# Patient Record
Sex: FEMALE | Race: BLACK OR AFRICAN AMERICAN | NOT HISPANIC OR LATINO | URBAN - METROPOLITAN AREA
[De-identification: names, ages, dates, MRNs, and addresses within clinical notes are randomized per-mention and may not be internally consistent; named-entity substitution may affect disease eponyms.]

---
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
## 2021-01-05 ENCOUNTER — APPOINTMENT (OUTPATIENT)
Dept: OBGYN | Facility: CLINIC | Age: 21
End: 2021-01-05

## 2021-01-24 ENCOUNTER — EMERGENCY (EMERGENCY)
Facility: HOSPITAL | Age: 21
LOS: 0 days | Discharge: HOME | End: 2021-01-24
Attending: EMERGENCY MEDICINE | Admitting: EMERGENCY MEDICINE
Payer: MEDICAID

## 2021-01-24 VITALS
SYSTOLIC BLOOD PRESSURE: 132 MMHG | TEMPERATURE: 99 F | RESPIRATION RATE: 17 BRPM | DIASTOLIC BLOOD PRESSURE: 63 MMHG | OXYGEN SATURATION: 100 % | HEART RATE: 82 BPM

## 2021-01-24 DIAGNOSIS — F41.9 ANXIETY DISORDER, UNSPECIFIED: ICD-10-CM

## 2021-01-24 DIAGNOSIS — Z88.8 ALLERGY STATUS TO OTHER DRUGS, MEDICAMENTS AND BIOLOGICAL SUBSTANCES: ICD-10-CM

## 2021-01-24 PROCEDURE — 99284 EMERGENCY DEPT VISIT MOD MDM: CPT

## 2021-01-24 RX ORDER — HYDROXYZINE HCL 10 MG
1 TABLET ORAL
Qty: 21 | Refills: 0
Start: 2021-01-24 | End: 2021-01-30

## 2021-01-24 RX ORDER — HYDROXYZINE HCL 10 MG
25 TABLET ORAL ONCE
Refills: 0 | Status: DISCONTINUED | OUTPATIENT
Start: 2021-01-24 | End: 2021-01-24

## 2021-01-24 NOTE — ED PROVIDER NOTE - PATIENT PORTAL LINK FT
You can access the FollowMyHealth Patient Portal offered by [facility] by registering at the following website: [email or web address]. By joining [employer]’s FollowMyHealth portal, you will also be able to view your health information using other applications (apps) compatible with our system.

## 2021-01-24 NOTE — ED BEHAVIORAL HEALTH ASSESSMENT NOTE - OTHER PAST PSYCHIATRIC HISTORY (INCLUDE DETAILS REGARDING ONSET, COURSE OF ILLNESS, INPATIENT/OUTPATIENT TREATMENT)
hearing voices, SI mental health, on Risperidone, Abilify; stopped at 18yo  Nervous ever since; adhd dx as well. behaviors - talking to rueda/brushes; As per mother, patient was diagnosed with Schizophrenia at 11yo after she started hearing voices. She was treated outpatient at [facility], and was on Risperidone, Abilify but stopped meds/trx at 18yo. Also reports history of ADHD. Mother notes she has been more nervous since stopping treatment. No history of SI/I/P or SA.

## 2021-01-24 NOTE — ED BEHAVIORAL HEALTH ASSESSMENT NOTE - SUMMARY
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 Psychiatry was consulted to perform a mental health evaluation.    On evaluation, patient presents as anxious and interested in outpatient treatment. She does not pose an acute safety risk to herself/others and does not warrant involuntary inpatient psychiatric admission at this time. Referral to be made to [facility] ([address] [place] [address], [id number]; [phone]). Please send patient home with 1wk supply of Atarax 50mg Q6 PRN for anxiety.

## 2021-01-24 NOTE — ED PROVIDER NOTE - CLINICAL SUMMARY MEDICAL DECISION MAKING FREE TEXT BOX
19 yo female with anxiety related to the father of her children going to long-term tomorrow, she has 2 small children  is unemployed and lives with mom.,  No SI/HI, patient was evaluated by psych service and cleared for d/c , outpatient follow up planned, patient was given contact info and was prescribed Hydroxyzine for anxiety,  Stable for d/c home, strict return precautions given.

## 2021-01-24 NOTE — ED PROVIDER NOTE - NS ED ROS FT
Eyes:  No visual changes, eye pain or discharge.  ENMT:  No hearing changes, pain, discharge or infections. No neck pain or stiffness.  Cardiac:  No chest pain, SOB or edema. No chest pain with exertion.  Respiratory:  No cough or respiratory distress. No hemoptysis. No history of asthma or RAD.  GI:  No nausea, vomiting, diarrhea or abdominal pain.  :  No dysuria, frequency or burning.  MS:  No myalgia, muscle weakness, joint pain or back pain.  Neuro:  No headache or weakness.  No LOC.  Skin:  No skin rash.

## 2021-01-24 NOTE — ED PROVIDER NOTE - NSFOLLOWUPCLINICS_GEN_ALL_ED_FT
[facility]  OP Mental Health  [address] [place] [id number]  Phone: [phone]  Fax:   Follow Up Time:

## 2021-01-24 NOTE — ED PROVIDER NOTE - OBJECTIVE STATEMENT
21 y/o F with no pmh presenting with anxiety. States that her anxiety and stress have been worsening over the past few weeks, states that her children's father is going to nursing home tomorrow and she has been very upset about this. Denies thoughts of hurting herself or others, no illicit drugs, alcohol, or smoking. No hallucinations. Patient recently gave birth in Oct. 2020, also had a 1.4 y/o at home. Loves with her mother at home who helps with the children. States that she has been crying a lot, isn't sleeping well. No medical complaints at this time. 19 y/o F with no pmh presenting with anxiety. States that her anxiety and stress have been worsening over the past few weeks, states that her children's father is going to [facility] tomorrow and she has been very upset about this. Denies thoughts of hurting herself or others, no illicit drugs, alcohol, or smoking. No hallucinations. Patient recently gave birth in Oct. 2020, also had a 1.4 y/o at home. Lives with her mother at home who helps with the children. States that she has been crying a lot, isn't sleeping well. No medical complaints at this time.

## 2021-01-24 NOTE — ED BEHAVIORAL HEALTH ASSESSMENT NOTE - HPI (INCLUDE ILLNESS QUALITY, SEVERITY, DURATION, TIMING, CONTEXT, MODIFYING FACTORS, ASSOCIATED SIGNS AND SYMPTOMS)
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 Psychiatry was consulted to perform a mental health evaluation. 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 Psychiatry was consulted to perform a mental health evaluation.    Patient reports that over the last 2 wks she has had worsening anxiety, accompanied by panic attacks. She reports during the episodes getting CP, SOB, crying, shaking, and pacing (including 1 earlier today). She also reports feeling anxious throughout the day, and noticing her sleep getting more dysregulated - she has difficulty falling asleep, resulting in waking up late and late day naps. She also mentions feeling "just weird" whenever she awakes from a nap. She noticed lack of appetite and some nausea today as well. She denies any depressed mood/feelings of hopelessness, suicidal ideations, plans, or intent. She relates all of her anxiety and panic attacks to the fact that her boyfriend is leaving tomorrow for correction for 1yr.    She denies any homicidal ideations, A/V hallucinations, paranoid thoughts, increased talkativeness/grandiosity/decreased need for sleep. She denies any substance use as well. Patient reports several coping skills she utilizes when she starts to feel panicked - going into a dark room, showering, sitting under the cool air of the window. Patient mentions that she is motivated to get treatment since she wants to be mentally healthy in order to be there for her babies.      COLLATERAL was obtained from patient's mother, Eloisa Barboza ([phone]). She reports that patient used to be on Risperidone and Abilify from [age]yo for Schizophrenia but came off meds on her own and stopped treatment. She denies noticing any recurrence of psychotic symptoms but has noticed patient has seemed more "nervous" since stopping the medications. She denies any acute safety concerns. She mentions that patient has a good support system at home and they will make sure to help her in order to get the treatment she needs.

## 2021-01-24 NOTE — ED PROVIDER NOTE - PROGRESS NOTE DETAILS
RAHAT (resident): psych. consulted, will come to see patient in the ED. Will f/u psych. recommendations. Dayna: patient endorsed to me to follow up psych eval, reassess and dispo. The patient appears well, she was evaluated by psychiatry and cleared for d/c home.  Will prescribe Hydroxyzine for anxiety, patient was advised to followup with mental health services outpatient, contact info was giv en to her by psych, strict return precautions given.  Patient verbalized understanding and is amenable with the plan.

## 2021-01-24 NOTE — ED PROVIDER NOTE - ATTENDING CONTRIBUTION TO CARE
20F no pmh p/w anxiety. states her childrens father and boyfriend is going to skilled nursing tomorrow for 1 year and shes very upset and depressed about this. no si, hi, hallucinations. no drugs, etoh. pt has a 1.5 yr old and 2 mo old. pt lives w mother who helps her with kids. pt states she cant sleep, eat, and is crying uncontrollably at times. pt used to be on risperadone at 14 yo for being "wild" stopped it on her own because he symptoms were improved. no prior psych admissions. mother is home w kids now. boyfriend dropped her off to ED but left and went home. boyfriend lives w grandmother. pt wants to see psych. has no psych follow up as outpt. no medical complaints.     denies fever, cough, cp, sob, abd pain, nvdc, dysuria, freq, hematuria, ha, numbness, weakness, palp, loc, trauma.     on exam, AFVSS, well udsty nad, ncat, eomi, perrla, mmm, lctab, rrr nl s1s2 no mrg, abd soft ntnd, aaox3, no focal deficits, no le edema or calf ttp, tearful     a/p; Anxiety, will get psych consult

## 2021-01-24 NOTE — ED PROVIDER NOTE - NSFOLLOWUPINSTRUCTIONS_ED_ALL_ED_FT
Anxiety    Generalized anxiety disorder (DUONG) is a mental disorder. It is defined as anxiety that is not necessarily related to specific events or activities or is out of proportion to said events. Symptoms include restlessness, fatigue, difficulty concentrations, irritability and difficulty concentrating. It interferes with life functions, including relationships, work, and school. If you were started on a medication make sure to take exactly as prescribed and follow up with a psychiatrist.    SEEK IMMEDIATE MEDICAL CARE IF YOU HAVE THE FOLLOWING SYMPTOMS: thoughts about hurting killing yourself, thoughts about hurting or killing somebody else, hallucinations, or worsening depression.

## 2021-01-24 NOTE — ED PROVIDER NOTE - PHYSICAL EXAMINATION
CONSTITUTIONAL: Well-developed; well-nourished  SKIN: warm, dry  HEAD: Normocephalic; atraumatic.  EYES: PERRL, EOMI, no conjunctival erythema  ENT: No nasal discharge; airway clear.  NECK: Supple; non tender.  CARD: S1, S2 normal; no murmurs, gallops, or rubs. Regular rate and rhythm.   RESP: No wheezes, rales or rhonchi.  ABD: soft ntnd  EXT: Normal ROM.  No clubbing, cyanosis or edema.   LYMPH: No acute cervical adenopathy.  NEURO: Alert, oriented, grossly unremarkable  PSYCH: Cooperative, appears tearful and anxious.

## 2021-01-24 NOTE — ED BEHAVIORAL HEALTH ASSESSMENT NOTE - DESCRIPTION
Uneventful, patient calm and in good behavioral control; no pharmacological or physical restraints were needed. none known Patient is in a relationship with the father of her 2 children (2mo, 2yo), but he is leaving for [employer] tomorrow for 1yr. She currently lives with the children and her mother. She is not employed, but cares for her children. She reports studying to get her GED as well as she made it to 12th grade but did not graduate.

## 2021-01-24 NOTE — ED BEHAVIORAL HEALTH ASSESSMENT NOTE - RISK ASSESSMENT
Modifiable risk factors include anxiety, psychiatric illness, recent psychosocial stressors, unemployment.  Protective factors include social support/family, parenthood, seeking out treatment/hopefullness. Low Acute Suicide Risk

## 2021-01-29 ENCOUNTER — OUTPATIENT (OUTPATIENT)
Dept: OUTPATIENT SERVICES | Facility: HOSPITAL | Age: 21
LOS: 1 days | Discharge: HOME | End: 2021-01-29

## 2021-01-29 ENCOUNTER — APPOINTMENT (OUTPATIENT)
Dept: PSYCHIATRY | Facility: CLINIC | Age: 21
End: 2021-01-29

## 2021-02-02 ENCOUNTER — APPOINTMENT (OUTPATIENT)
Dept: PSYCHIATRY | Facility: CLINIC | Age: 21
End: 2021-02-02

## 2021-02-02 ENCOUNTER — OUTPATIENT (OUTPATIENT)
Dept: OUTPATIENT SERVICES | Facility: HOSPITAL | Age: 21
LOS: 1 days | Discharge: HOME | End: 2021-02-02

## 2021-02-02 DIAGNOSIS — G47.00 INSOMNIA, UNSPECIFIED: ICD-10-CM

## 2021-02-02 DIAGNOSIS — F43.22 ADJUSTMENT DISORDER WITH ANXIETY: ICD-10-CM

## 2021-02-03 ENCOUNTER — OUTPATIENT (OUTPATIENT)
Dept: OUTPATIENT SERVICES | Facility: HOSPITAL | Age: 21
LOS: 1 days | Discharge: HOME | End: 2021-02-03

## 2021-02-03 ENCOUNTER — APPOINTMENT (OUTPATIENT)
Dept: PSYCHIATRY | Facility: CLINIC | Age: 21
End: 2021-02-03

## 2021-02-03 DIAGNOSIS — F43.22 ADJUSTMENT DISORDER WITH ANXIETY: ICD-10-CM

## 2021-02-11 ENCOUNTER — APPOINTMENT (OUTPATIENT)
Dept: PSYCHIATRY | Facility: CLINIC | Age: 21
End: 2021-02-11

## 2021-02-11 ENCOUNTER — OUTPATIENT (OUTPATIENT)
Dept: OUTPATIENT SERVICES | Facility: HOSPITAL | Age: 21
LOS: 1 days | Discharge: HOME | End: 2021-02-11

## 2021-02-11 DIAGNOSIS — F43.22 ADJUSTMENT DISORDER WITH ANXIETY: ICD-10-CM

## 2021-02-22 ENCOUNTER — TRANSCRIPTION ENCOUNTER (OUTPATIENT)
Age: 21
End: 2021-02-22

## 2021-02-22 ENCOUNTER — APPOINTMENT (OUTPATIENT)
Dept: PSYCHIATRY | Facility: CLINIC | Age: 21
End: 2021-02-22

## 2021-02-22 ENCOUNTER — OUTPATIENT (OUTPATIENT)
Dept: OUTPATIENT SERVICES | Facility: HOSPITAL | Age: 21
LOS: 1 days | Discharge: HOME | End: 2021-02-22

## 2021-02-23 DIAGNOSIS — F43.22 ADJUSTMENT DISORDER WITH ANXIETY: ICD-10-CM

## 2021-02-24 ENCOUNTER — APPOINTMENT (OUTPATIENT)
Dept: PSYCHIATRY | Facility: CLINIC | Age: 21
End: 2021-02-24

## 2021-02-24 ENCOUNTER — OUTPATIENT (OUTPATIENT)
Dept: OUTPATIENT SERVICES | Facility: HOSPITAL | Age: 21
LOS: 1 days | Discharge: HOME | End: 2021-02-24

## 2021-02-24 DIAGNOSIS — F43.23 ADJUSTMENT DISORDER WITH MIXED ANXIETY AND DEPRESSED MOOD: ICD-10-CM

## 2021-03-08 ENCOUNTER — APPOINTMENT (OUTPATIENT)
Dept: PSYCHIATRY | Facility: CLINIC | Age: 21
End: 2021-03-08

## 2021-03-09 ENCOUNTER — APPOINTMENT (OUTPATIENT)
Dept: OBGYN | Facility: CLINIC | Age: 21
End: 2021-03-09

## 2021-03-09 NOTE — ED BEHAVIORAL HEALTH ASSESSMENT NOTE - NS ED [facility] ED COURSE PSYCHIATRIC MEDICATION GIVEN
Diagnosis: Dysphonia R49.0      Precautions: Fall  Insurance Type (# Auth): Medicare (10 visits)   Date POC Expires: 5/30/2021      Total Treatment time: 60 min  Charges: [id number]    Treatment Number: 6/17    Subjective: She passed a temperature screening and None

## 2021-03-11 ENCOUNTER — OUTPATIENT (OUTPATIENT)
Dept: OUTPATIENT SERVICES | Facility: HOSPITAL | Age: 21
LOS: 1 days | Discharge: HOME | End: 2021-03-11

## 2021-03-11 ENCOUNTER — APPOINTMENT (OUTPATIENT)
Dept: PSYCHIATRY | Facility: CLINIC | Age: 21
End: 2021-03-11
Payer: MEDICAID

## 2021-03-11 DIAGNOSIS — F43.23 ADJUSTMENT DISORDER WITH MIXED ANXIETY AND DEPRESSED MOOD: ICD-10-CM

## 2021-03-11 PROCEDURE — ZZZZZ: CPT

## 2021-03-26 ENCOUNTER — APPOINTMENT (OUTPATIENT)
Dept: PSYCHIATRY | Facility: CLINIC | Age: 21
End: 2021-03-26

## 2021-04-12 ENCOUNTER — APPOINTMENT (OUTPATIENT)
Dept: PSYCHIATRY | Facility: CLINIC | Age: 21
End: 2021-04-12

## 2021-04-12 DIAGNOSIS — F43.23 ADJUSTMENT DISORDER WITH MIXED ANXIETY AND DEPRESSED MOOD: ICD-10-CM

## 2021-04-14 NOTE — OB RN TRIAGE NOTE - TEMPERATURE IN FAHRENHEIT (DEGREES F)
No new care gaps identified.  Powered by [employer]. Reference number: [id number].   4/14/2021 11:16:18 AM CDT   98.6

## 2021-04-19 ENCOUNTER — APPOINTMENT (OUTPATIENT)
Dept: OBGYN | Facility: CLINIC | Age: 21
End: 2021-04-19

## 2021-05-06 ENCOUNTER — APPOINTMENT (OUTPATIENT)
Dept: PSYCHIATRY | Facility: CLINIC | Age: 21
End: 2021-05-06

## 2021-05-11 ENCOUNTER — APPOINTMENT (OUTPATIENT)
Dept: OBGYN | Facility: CLINIC | Age: 21
End: 2021-05-11
Payer: MEDICAID

## 2021-05-11 PROCEDURE — 99395 PREV VISIT EST AGE 18-39: CPT

## 2021-05-11 PROCEDURE — 87490 CHLMYD TRACH DNA DIR PROBE: CPT

## 2021-05-11 PROCEDURE — 99072 ADDL SUPL MATRL&STAF TM PHE: CPT

## 2021-05-24 ENCOUNTER — NON-APPOINTMENT (OUTPATIENT)
Age: 21
End: 2021-05-24

## 2021-06-01 ENCOUNTER — APPOINTMENT (OUTPATIENT)
Dept: OBGYN | Facility: CLINIC | Age: 21
End: 2021-06-01

## 2021-06-03 ENCOUNTER — APPOINTMENT (OUTPATIENT)
Dept: OBGYN | Facility: CLINIC | Age: 21
End: 2021-06-03

## 2021-06-08 ENCOUNTER — NON-APPOINTMENT (OUTPATIENT)
Age: 21
End: 2021-06-08

## 2021-06-23 NOTE — PROCEDURE NOTE - NSCATHREMOVE_OBGYN_ALL_OB
No, to be performed by RN per instructions Complex Repair And Graft Additional Text (Will Appearing After The Standard Complex Repair Text): The complex repair was not sufficient to completely close the primary defect. The remaining additional defect was repaired with the graft mentioned below.

## 2021-07-02 ENCOUNTER — NON-APPOINTMENT (OUTPATIENT)
Age: 21
End: 2021-07-02

## 2021-07-15 ENCOUNTER — NON-APPOINTMENT (OUTPATIENT)
Age: 21
End: 2021-07-15

## 2021-07-15 ENCOUNTER — APPOINTMENT (OUTPATIENT)
Dept: OBGYN | Facility: CLINIC | Age: 21
End: 2021-07-15
Payer: MEDICAID

## 2021-07-15 PROCEDURE — 99213 OFFICE O/P EST LOW 20 MIN: CPT

## 2021-07-15 PROCEDURE — 76830 TRANSVAGINAL US NON-OB: CPT

## 2021-07-26 ENCOUNTER — APPOINTMENT (OUTPATIENT)
Dept: OBGYN | Facility: CLINIC | Age: 21
End: 2021-07-26

## 2021-08-03 ENCOUNTER — EMERGENCY (EMERGENCY)
Facility: HOSPITAL | Age: 21
LOS: 0 days | Discharge: HOME | End: 2021-08-03
Attending: EMERGENCY MEDICINE | Admitting: EMERGENCY MEDICINE
Payer: MEDICAID

## 2021-08-03 VITALS
HEART RATE: 80 BPM | SYSTOLIC BLOOD PRESSURE: 112 MMHG | DIASTOLIC BLOOD PRESSURE: 57 MMHG | HEIGHT: 67 IN | TEMPERATURE: 97 F | RESPIRATION RATE: 18 BRPM | OXYGEN SATURATION: 99 %

## 2021-08-03 DIAGNOSIS — R22.0 LOCALIZED SWELLING, MASS AND LUMP, HEAD: ICD-10-CM

## 2021-08-03 DIAGNOSIS — K08.89 OTHER SPECIFIED DISORDERS OF TEETH AND SUPPORTING STRUCTURES: ICD-10-CM

## 2021-08-03 DIAGNOSIS — Z88.8 ALLERGY STATUS TO OTHER DRUGS, MEDICAMENTS AND BIOLOGICAL SUBSTANCES: ICD-10-CM

## 2021-08-03 DIAGNOSIS — K01.1 IMPACTED TEETH: ICD-10-CM

## 2021-08-03 PROCEDURE — 99284 EMERGENCY DEPT VISIT MOD MDM: CPT

## 2021-08-03 RX ORDER — AMOXICILLIN 250 MG/5ML
1 SUSPENSION, RECONSTITUTED, ORAL (ML) ORAL
Qty: 21 | Refills: 0
Start: 2021-08-03 | End: 2021-08-09

## 2021-08-03 NOTE — ED PROVIDER NOTE - PATIENT PORTAL LINK FT
You can access the FollowMyHealth Patient Portal offered by  by registering at the following website: [email or web address]. By joining [employer]’s FollowMyHealth portal, you will also be able to view your health information using other applications (apps) compatible with our system.

## 2021-08-03 NOTE — ED PROVIDER NOTE - NS ED ROS FT
Consitutional: No fever, chills  ENT: +dental pain  Cardiac:  No chest pain  Respiratory:  No sob  GI:  No abdominal pain, nausea, vomiting, or diarrhea  Neuro:  No headache   Skin:  No skin rass

## 2021-08-03 NOTE — ED PROVIDER NOTE - OBJECTIVE STATEMENT
21yF w/o pmhx who present with left facial swelling. she's had 3 weeks of left lower wisdom tooth pain. today it started swelling and since her sister is coming to the ED 21yF w/o pmhx who present with left facial swelling. she's had 3 weeks of left lower wisdom tooth pain. today it started swelling and since her sister is coming to the ED today she wants 21yF w/o pmhx who present with left facial swelling. she's had 3 weeks of left lower wisdom tooth pain. today it started swelling and since her sister is coming to the ED today she wants to get seen by dental. no fever cp sob abd pain.

## 2021-08-03 NOTE — ED PROVIDER NOTE - CLINICAL SUMMARY MEDICAL DECISION MAKING FREE TEXT BOX
20 y/o M no PMH coming in with 1 day of L facial swelling. Over the past two weeks, Pt states that her L lower wisdom tooth has been hurting her. Not taking any meds for it. Pt has a private dentist but he/she is unable to give her an appointment which is what prompted the ED visit. No other symptoms, no trouble tolerating PO or difficulty breathing. On exam: Gen - NAD, Head - NCAT, (+) Tooth 17 with some surrounding gingival edema, no erythema. Mild TTP. No facial edema, no lymphadenopathy. Ext- FROM, no edema, erythema, ecchymosis, Neuro - CN 2-12 intact, nl strength and sensation, nl gait. Diagnosis: Dental pain. Transfer to dental clinic. 20 y/o M no PMH coming in with 1 day of L facial swelling. Over the past two weeks, Pt states that her L lower wisdom tooth has been hurting her. Not taking any meds for it. Pt has a private dentist but he/she is unable to give her an appointment which is what prompted the ED visit. No other symptoms, no trouble tolerating PO or difficulty breathing. On exam: Gen - NAD, Head - NCAT, (+) Tooth 17 with some surrounding gingival edema, no erythema. Mild TTP. No facial edema, no lymphadenopathy. Ext- FROM, no edema, erythema, ecchymosis, Neuro - CN 2-12 intact, nl strength and sensation, nl gait. Diagnosis: Dental pain. Transfer to dental clinic. Dental clinic closed prior to patient transfer. Dental resident evaluated patient at bedside in ED and performed dental block. Pt d/artemio home with Abx and advised f/u with dentist outpatient. Given return precautions.

## 2021-08-03 NOTE — ED PROVIDER NOTE - PHYSICAL EXAMINATION
CONSTITUTIONAL: Well-developed; well-nourished; in no acute distress.   SKIN: warm, dry  ENT: left lower tooth 17 tender to palpation, no exudate discharge or abscess  CARD: Regular rate and rhythm  RESP: CTAB  ABD: soft ntnd  NEURO: Alert, oriented, grossly unremarkable  PSYCH: Cooperative, appropriate.

## 2021-08-03 NOTE — ED PROVIDER NOTE - NSFOLLOWUPCLINICS_GEN_ALL_ED_FT
[facility]  Dental  [address] [place] [address]  Phone: [phone]  Fax:   Follow Up Time: 4-6 Days

## 2021-08-04 ENCOUNTER — APPOINTMENT (OUTPATIENT)
Dept: OTOLARYNGOLOGY | Facility: CLINIC | Age: 21
End: 2021-08-04

## 2021-09-02 ENCOUNTER — APPOINTMENT (OUTPATIENT)
Dept: OBGYN | Facility: CLINIC | Age: 21
End: 2021-09-02
Payer: MEDICAID

## 2021-09-02 PROCEDURE — 87075 CULTR BACTERIA EXCEPT BLOOD: CPT

## 2021-09-02 PROCEDURE — 99213 OFFICE O/P EST LOW 20 MIN: CPT

## 2021-09-28 ENCOUNTER — APPOINTMENT (OUTPATIENT)
Dept: OBGYN | Facility: CLINIC | Age: 21
End: 2021-09-28

## 2021-10-01 ENCOUNTER — APPOINTMENT (OUTPATIENT)
Dept: OTOLARYNGOLOGY | Facility: CLINIC | Age: 21
End: 2021-10-01

## 2022-01-20 ENCOUNTER — APPOINTMENT (OUTPATIENT)
Dept: OBGYN | Facility: CLINIC | Age: 22
End: 2022-01-20

## 2022-03-01 ENCOUNTER — APPOINTMENT (OUTPATIENT)
Dept: OBGYN | Facility: CLINIC | Age: 22
End: 2022-03-01
Payer: MEDICAID

## 2022-03-01 PROCEDURE — 76817 TRANSVAGINAL US OBSTETRIC: CPT

## 2022-03-01 PROCEDURE — 81025 URINE PREGNANCY TEST: CPT

## 2022-03-01 PROCEDURE — 87077 CULTURE AEROBIC IDENTIFY: CPT | Mod: QW

## 2022-03-01 PROCEDURE — 99215 OFFICE O/P EST HI 40 MIN: CPT

## 2022-03-15 ENCOUNTER — APPOINTMENT (OUTPATIENT)
Dept: OBGYN | Facility: CLINIC | Age: 22
End: 2022-03-15
Payer: MEDICAID

## 2022-03-15 ENCOUNTER — APPOINTMENT (OUTPATIENT)
Dept: OBGYN | Facility: CLINIC | Age: 22
End: 2022-03-15

## 2022-03-15 PROCEDURE — 76830 TRANSVAGINAL US NON-OB: CPT

## 2022-03-15 PROCEDURE — 99214 OFFICE O/P EST MOD 30 MIN: CPT

## 2022-04-05 ENCOUNTER — APPOINTMENT (OUTPATIENT)
Dept: OBGYN | Facility: CLINIC | Age: 22
End: 2022-04-05

## 2022-04-08 NOTE — ED BEHAVIORAL HEALTH ASSESSMENT NOTE - NSBHINFOSOURCE_PSY_ALL_CORE
What Type Of Note Output Would You Prefer (Optional)?: Standard Output How Severe Is Your Skin Lesion?: moderate Has Your Skin Lesion Been Treated?: not been treated Is This A New Presentation, Or A Follow-Up?: Skin Lesion Additional History: Patient states he runs and his toe look “funny.” Patient

## 2022-04-26 ENCOUNTER — APPOINTMENT (OUTPATIENT)
Dept: OBGYN | Facility: CLINIC | Age: 22
End: 2022-04-26
Payer: MEDICAID

## 2022-04-26 PROCEDURE — 99213 OFFICE O/P EST LOW 20 MIN: CPT

## 2022-05-19 NOTE — DISCHARGE NOTE OB - BREAST MILK SUPPORTS STABLE NEWBORN BLOOD SUGAR
Detail Level: Generalized
Detail Level: Detailed
Patient Specific Counseling (Will Not Stick From Patient To Patient): (Including spot of concern)
Statement Selected

## 2022-05-23 ENCOUNTER — APPOINTMENT (OUTPATIENT)
Dept: OBGYN | Facility: CLINIC | Age: 22
End: 2022-05-23

## 2022-06-10 ENCOUNTER — EMERGENCY (EMERGENCY)
Facility: HOSPITAL | Age: 22
LOS: 0 days | Discharge: HOME | End: 2022-06-10
Attending: EMERGENCY MEDICINE | Admitting: EMERGENCY MEDICINE
Payer: MEDICAID

## 2022-06-10 VITALS
SYSTOLIC BLOOD PRESSURE: 121 MMHG | OXYGEN SATURATION: 98 % | HEIGHT: 67 IN | HEART RATE: 99 BPM | DIASTOLIC BLOOD PRESSURE: 71 MMHG | TEMPERATURE: 98 F | RESPIRATION RATE: 18 BRPM

## 2022-06-10 DIAGNOSIS — M79.18 MYALGIA, OTHER SITE: ICD-10-CM

## 2022-06-10 DIAGNOSIS — Z88.6 ALLERGY STATUS TO ANALGESIC AGENT: ICD-10-CM

## 2022-06-10 DIAGNOSIS — O99.512 DISEASES OF THE RESPIRATORY SYSTEM COMPLICATING PREGNANCY, SECOND TRIMESTER: ICD-10-CM

## 2022-06-10 DIAGNOSIS — Z20.822 CONTACT WITH AND (SUSPECTED) EXPOSURE TO COVID-19: ICD-10-CM

## 2022-06-10 DIAGNOSIS — J06.9 ACUTE UPPER RESPIRATORY INFECTION, UNSPECIFIED: ICD-10-CM

## 2022-06-10 DIAGNOSIS — R51.9 HEADACHE, UNSPECIFIED: ICD-10-CM

## 2022-06-10 DIAGNOSIS — Z3A.19 19 WEEKS GESTATION OF PREGNANCY: ICD-10-CM

## 2022-06-10 DIAGNOSIS — O99.891 OTHER SPECIFIED DISEASES AND CONDITIONS COMPLICATING PREGNANCY: ICD-10-CM

## 2022-06-10 LAB
FLUAV AG NPH QL: SIGNIFICANT CHANGE UP
FLUBV AG NPH QL: SIGNIFICANT CHANGE UP
RSV RNA NPH QL NAA+NON-PROBE: SIGNIFICANT CHANGE UP
SARS-COV-2 RNA SPEC QL NAA+PROBE: SIGNIFICANT CHANGE UP

## 2022-06-10 PROCEDURE — 76815 OB US LIMITED FETUS(S): CPT | Mod: 26

## 2022-06-10 PROCEDURE — 99285 EMERGENCY DEPT VISIT HI MDM: CPT | Mod: 25

## 2022-06-10 NOTE — ED ADULT NURSE NOTE - DRUG PRE-SCREENING (DAST -1)
DATE:  02/18/2019



CARDIOLOGY FOLLOWUP



SUBJECTIVE:  The patient is awake, alert.  No chest pain noted.



PHYSICAL EXAMINATION

VITAL SIGNS:  Blood pressure 135/50 and the heart rate in the 80s.

NECK:  Negative JVD.

LUNGS:  Without rales.

HEART:  Reveal S1 and S2.

EXTREMITIES:  Without edema.



LABORATORY DATA:  Hemoglobin is 12.2.  Chemistries, BUN and creatinine

unremarkable.  Glucose 127.



IMPRESSION:

1.  Status post percutaneous transluminal coronary angioplasty and stent.

2.  Coronary artery disease.

3.  Status post gastrointestinal bleed.

4.  Dilated cardiomyopathy.

5.  Resolution of congestive heart failure.



Given these findings, we will continue on the aspirin and Plavix.  We will

discontinue the milrinone today.  The patient can be transferred to

telemetry.







__________________________________________

Onel Ann MD





DD:  02/18/2019 10:17:56

DT:  02/18/2019 10:25:29

Job # [id number] Statement Selected

## 2022-06-10 NOTE — ED PROVIDER NOTE - PROGRESS NOTE DETAILS
EILEEN:  on TAUS; pt told that the gender we gave her is unofficial, and has to be done by her OB at her upcoming anatomy appt.

## 2022-06-10 NOTE — ED ADULT NURSE NOTE - NSFALLRSKUNASSIST_ED_ALL_ED
"Progress Note:  2/10/2018, 10:04 AM    S: Rapid afib overnight, transferred to CICU. More neck pain and erythema.  CT neck repeated showing small abscess at base of neck and inflammation around the superior portion of the clavicle and the R SC joint.  There is some tissue air as well    O:  Blood pressure 154/97, pulse (!) 114, temperature 37.6 °C (99.7 °F), resp. rate 20, height 1.549 m (5' 1\"), weight 74.5 kg (164 lb 3.9 oz), SpO2 94 %, not currently breastfeeding.    -Uncomfortable  -breathing is not labored  -Tender erythema R base of neck, medial shoulder, and superior to clavicle. There is no palpable crepitus at this time.  There is no overt tenderness over R SC joint, but the erythema has become more medial      A:   Active Hospital Problems    Diagnosis   • Sepsis (CMS-HCC) [A41.9]     Priority: High   • Hypokalemia [E87.6]   • Lactic acidosis [E87.2]   • Neck pain [M54.2]   • Hyponatremia [E87.1]   • CAP (community acquired pneumonia) [J18.9]          P:   -Recommend ENT/Head & neck surgery consult for neck abscess.   -I think that the SC joint inflammatory process is secondary to surrounding infection of neck, but she may end up need SC joint resection.  No abscess of SC joint at this time. I would favor dealing with the neck collection first.  I am available to discuss with ENT any time.  -Continue antibiotics  -agree with CICU transfer for management of dysrhythmia  -The above was discussed with Dr. Badillo, the patient and her daughter    Gavin Gong M.D.  [facility]  [phone]    " no

## 2022-06-10 NOTE — ED PROVIDER NOTE - NS ED ROS FT
Constitutional:  No fever, chills, lethargy, or abnormal weight loss  Eyes:  No eye pain or visual changes  ENMT: +dental pain. No nasal discharge, no sore throat. No neck pain or stiffness  Cardiac:  No chest pain or palpitations  Respiratory:  No cough or respiratory distress  GI:  No nausea, vomiting, diarrhea or abdominal pain  :  No dysuria, frequency, or hematuria  MS:  +diffuse myalgias  Neuro:  +headache. No numbness, weakness, or tingling  Skin:  No skin rash or pruritus.   Except as documented in the HPI,  all other systems are negative

## 2022-06-10 NOTE — ED PROVIDER NOTE - CLINICAL SUMMARY MEDICAL DECISION MAKING FREE TEXT BOX
pt with HA likely viral vs allergic. Educated on tx and will fu with her OB and referred to new PMD. Also with tooth pain, pt sent to dental from the ED.

## 2022-06-10 NOTE — ED PROVIDER NOTE - ATTENDING CONTRIBUTION TO CARE
21 yo ~ 19 weeks pregnant per patient here with frontal HA and saying that she gets leg cramping. Pt has had leg cramping in prior pregnancy. Pt reports OB care but needs new PMD. Says she gets frontal HA. Some improvement with Tylenol. On exam pt is very well appearing, non toxic, HEENT with TTP at number #17 with some ttp of the gingiva without obvious collection, no trismus, CN intact, neck supple, chest clear, s1s2, no edema, nml gait 5/5 through t and negative rhomberg.  In way pt describes sx likley viral vs allergic. Pt educated on tx during pregnancy and to dental for evaluation of tooth.

## 2022-06-10 NOTE — ED PROVIDER NOTE - PHYSICAL EXAMINATION
VITAL SIGNS: I have reviewed nursing notes and confirm.  CONSTITUTIONAL: Well-appearing, non-toxic, in NAD  SKIN: Warm dry, normal skin turgor  HEAD: NCAT  EYES: No conjunctival injection, scleral anicteric  ENT: Moist mucous membranes, normal pharynx with no erythema or exudates. There is a dental corrina to Tooth 16 with small surrounding gingival hyperplasia. No floor of mouth tenderness or buccal swelling.   NECK: Supple; full ROM. Nontender. No cervical LAD  CARD: RRR, no murmurs, rubs or gallops  RESP: Clear to ausculation bilaterally.  No rales, rhonchi, or wheezing.  ABD: Soft, gravid abdomen, non-tender, no rebound or guarding. No CVA tenderness  EXT: Full ROM, no bony tenderness, no pedal edema, no calf tenderness  NEURO: Normal motor, normal sensory.Strength 5/5 thorughout. No pronator drift. CN II-XII intact and equal bilaterally. Cerebellar testing normal. Normal gait.  PSYCH: Cooperative, appropriate.

## 2022-06-10 NOTE — ED PROVIDER NOTE - CARE PROVIDER_API CALL
Erick La (MD)  Internal Medicine  [address] [place] [address]  Phone: [phone]  Fax: [phone]  Follow Up Time: 1-3 Days

## 2022-06-10 NOTE — ED PROVIDER NOTE - NSFOLLOWUPINSTRUCTIONS_ED_ALL_ED_FT
Please take Flonase on a daily basis, as this can better control your symptoms.    Please return to the ED immediately with any abdominal pain, intractable vomiting, fevers or chills, or shortness or breath or difficulty breathing at rest.

## 2022-06-10 NOTE — ED PROVIDER NOTE - OBJECTIVE STATEMENT
23yo female  at 19 weeks gestations (followed by OB, appt scheduled for next week for anatomy) presenting for headache x7days described as generalized throbbing relieved temporarily by tylenol, associated with diffuse body aches. No F/C at home. Presented to her UC last week and had a negative Covid/flu swab.   She has a secondary complaint of tooth pain to the left lower jaw.

## 2022-06-13 ENCOUNTER — APPOINTMENT (OUTPATIENT)
Dept: OBGYN | Facility: CLINIC | Age: 22
End: 2022-06-13

## 2022-06-16 ENCOUNTER — APPOINTMENT (OUTPATIENT)
Dept: OBGYN | Facility: CLINIC | Age: 22
End: 2022-06-16
Payer: MEDICAID

## 2022-06-16 PROCEDURE — 99213 OFFICE O/P EST LOW 20 MIN: CPT

## 2022-06-27 ENCOUNTER — APPOINTMENT (OUTPATIENT)
Dept: ANTEPARTUM | Facility: CLINIC | Age: 22
End: 2022-06-27

## 2022-07-14 ENCOUNTER — APPOINTMENT (OUTPATIENT)
Dept: OBGYN | Facility: CLINIC | Age: 22
End: 2022-07-14

## 2022-07-14 PROCEDURE — 99213 OFFICE O/P EST LOW 20 MIN: CPT | Mod: 25

## 2022-07-14 PROCEDURE — 76811 OB US DETAILED SNGL FETUS: CPT

## 2022-08-04 ENCOUNTER — APPOINTMENT (OUTPATIENT)
Dept: OBGYN | Facility: CLINIC | Age: 22
End: 2022-08-04

## 2022-08-04 PROCEDURE — 99213 OFFICE O/P EST LOW 20 MIN: CPT

## 2022-08-22 ENCOUNTER — APPOINTMENT (OUTPATIENT)
Dept: OBGYN | Facility: CLINIC | Age: 22
End: 2022-08-22

## 2022-08-23 ENCOUNTER — APPOINTMENT (OUTPATIENT)
Dept: ANTEPARTUM | Facility: CLINIC | Age: 22
End: 2022-08-23

## 2022-08-23 ENCOUNTER — OUTPATIENT (OUTPATIENT)
Dept: OUTPATIENT SERVICES | Facility: HOSPITAL | Age: 22
LOS: 1 days | Discharge: HOME | End: 2022-08-23

## 2022-08-23 ENCOUNTER — ASOB RESULT (OUTPATIENT)
Age: 22
End: 2022-08-23

## 2022-08-23 PROCEDURE — 76805 OB US >/= 14 WKS SNGL FETUS: CPT | Mod: 26

## 2022-08-23 PROCEDURE — 76819 FETAL BIOPHYS PROFIL W/O NST: CPT | Mod: 26

## 2022-08-23 PROCEDURE — 76820 UMBILICAL ARTERY ECHO: CPT | Mod: 26

## 2022-08-30 DIAGNOSIS — O09.33 SUPERVISION OF PREGNANCY WITH INSUFFICIENT ANTENATAL CARE, THIRD TRIMESTER: ICD-10-CM

## 2022-08-30 DIAGNOSIS — Z3A.30 30 WEEKS GESTATION OF PREGNANCY: ICD-10-CM

## 2022-08-30 DIAGNOSIS — O26.843 UTERINE SIZE-DATE DISCREPANCY, THIRD TRIMESTER: ICD-10-CM

## 2022-08-30 DIAGNOSIS — O99.213 OBESITY COMPLICATING PREGNANCY, THIRD TRIMESTER: ICD-10-CM

## 2022-09-01 ENCOUNTER — ASOB RESULT (OUTPATIENT)
Age: 22
End: 2022-09-01

## 2022-09-01 ENCOUNTER — OUTPATIENT (OUTPATIENT)
Dept: OUTPATIENT SERVICES | Facility: HOSPITAL | Age: 22
LOS: 1 days | Discharge: HOME | End: 2022-09-01

## 2022-09-01 ENCOUNTER — APPOINTMENT (OUTPATIENT)
Dept: ANTEPARTUM | Facility: CLINIC | Age: 22
End: 2022-09-01

## 2022-09-01 PROCEDURE — 76820 UMBILICAL ARTERY ECHO: CPT | Mod: 26

## 2022-09-01 PROCEDURE — 76818 FETAL BIOPHYS PROFILE W/NST: CPT | Mod: 26

## 2022-09-01 PROCEDURE — 99213 OFFICE O/P EST LOW 20 MIN: CPT | Mod: 25

## 2022-09-01 NOTE — DISCUSSION/SUMMARY
[FreeTextEntry1] : 22y  at 31w3d (adam 10/31 by LMP=7w outsideCRL) returns for fetal testing for AC 7th%ile.  Notably, the remainder of biometry on  was >60th%ile.  \par Chart review shows that sequential screening was ordered, but it is unclear whether pt ever had labs drawn.  We have no record of NT measurement or FTS labs, nor 2nd tri QUAD screen.  Ms Rutherford may have been lost to f/u during part of this pregnancy. \par \par Family Planning: Not found in chart, and not reviewed today. \par Immunizations: Not reviewed today. \par \par 1.  DE JESUS INTRAUTERINE GESTATION.\par 2.  ADEQUATE AMNIOTIC FLUID VOLUME.\par 3.  REASSURING  TESTING.  NST BASELINE 140 BEATS PER MINUTE.  MODERATE VARIABILITY.  POSITIVE ACCELERATIONS to 155. \par      NO DECELERATIONS.  NO CONTRACTIONS.\par 4.  THE UMBILICAL ARTERY DOPPLER FLOW IS WITHIN NORMAL LIMITS.\par I discussed these findings with Ms Rutherford who asked appropriate concerns and asked excellent questions.  She asked whether small AC could be due to Down Syndrome.  I reviewed the nature of 3rd trimester growth restriction and its association with placental issues, while FGR associated with Downs and similar problems is typically detected in the 2nd trimester.  \par \par RT one week for repeat BPP/NST and UA dopplers.  We will also repeat EFW to confirm earlier AC measurement.\par \par MD Jovanna, FACOG

## 2022-09-02 DIAGNOSIS — O26.849 UTERINE SIZE-DATE DISCREPANCY, UNSPECIFIED TRIMESTER: ICD-10-CM

## 2022-09-02 DIAGNOSIS — O36.5930 MATERNAL CARE FOR OTHER KNOWN OR SUSPECTED POOR FETAL GROWTH, THIRD TRIMESTER, NOT APPLICABLE OR UNSPECIFIED: ICD-10-CM

## 2022-09-02 DIAGNOSIS — Z3A.31 31 WEEKS GESTATION OF PREGNANCY: ICD-10-CM

## 2022-09-02 DIAGNOSIS — O99.213 OBESITY COMPLICATING PREGNANCY, THIRD TRIMESTER: ICD-10-CM

## 2022-09-08 ENCOUNTER — APPOINTMENT (OUTPATIENT)
Dept: ANTEPARTUM | Facility: CLINIC | Age: 22
End: 2022-09-08

## 2022-09-08 ENCOUNTER — ASOB RESULT (OUTPATIENT)
Age: 22
End: 2022-09-08

## 2022-09-08 ENCOUNTER — APPOINTMENT (OUTPATIENT)
Dept: OBGYN | Facility: CLINIC | Age: 22
End: 2022-09-08

## 2022-09-08 ENCOUNTER — OUTPATIENT (OUTPATIENT)
Dept: OUTPATIENT SERVICES | Facility: HOSPITAL | Age: 22
LOS: 1 days | Discharge: HOME | End: 2022-09-08

## 2022-09-08 PROCEDURE — 76816 OB US FOLLOW-UP PER FETUS: CPT | Mod: 26

## 2022-09-08 PROCEDURE — 76818 FETAL BIOPHYS PROFILE W/NST: CPT | Mod: 26,59

## 2022-09-08 PROCEDURE — ZZZZZ: CPT

## 2022-09-08 PROCEDURE — 76820 UMBILICAL ARTERY ECHO: CPT | Mod: 26,59

## 2022-09-13 DIAGNOSIS — O26.843 UTERINE SIZE-DATE DISCREPANCY, THIRD TRIMESTER: ICD-10-CM

## 2022-09-13 DIAGNOSIS — Z3A.32 32 WEEKS GESTATION OF PREGNANCY: ICD-10-CM

## 2022-09-13 DIAGNOSIS — O36.5930 MATERNAL CARE FOR OTHER KNOWN OR SUSPECTED POOR FETAL GROWTH, THIRD TRIMESTER, NOT APPLICABLE OR UNSPECIFIED: ICD-10-CM

## 2022-09-13 DIAGNOSIS — O99.213 OBESITY COMPLICATING PREGNANCY, THIRD TRIMESTER: ICD-10-CM

## 2022-09-13 DIAGNOSIS — O09.30 SUPERVISION OF PREGNANCY WITH INSUFFICIENT ANTENATAL CARE, UNSPECIFIED TRIMESTER: ICD-10-CM

## 2022-09-15 ENCOUNTER — APPOINTMENT (OUTPATIENT)
Dept: OBGYN | Facility: CLINIC | Age: 22
End: 2022-09-15

## 2022-09-15 ENCOUNTER — OUTPATIENT (OUTPATIENT)
Dept: OUTPATIENT SERVICES | Facility: HOSPITAL | Age: 22
LOS: 1 days | Discharge: HOME | End: 2022-09-15

## 2022-09-15 ENCOUNTER — APPOINTMENT (OUTPATIENT)
Dept: ANTEPARTUM | Facility: CLINIC | Age: 22
End: 2022-09-15

## 2022-09-15 ENCOUNTER — ASOB RESULT (OUTPATIENT)
Age: 22
End: 2022-09-15

## 2022-09-15 VITALS
HEIGHT: 68 IN | SYSTOLIC BLOOD PRESSURE: 120 MMHG | DIASTOLIC BLOOD PRESSURE: 75 MMHG | BODY MASS INDEX: 20.76 KG/M2 | WEIGHT: 137 LBS

## 2022-09-15 PROCEDURE — 76818 FETAL BIOPHYS PROFILE W/NST: CPT | Mod: 26

## 2022-09-15 PROCEDURE — 76820 UMBILICAL ARTERY ECHO: CPT | Mod: 26,59

## 2022-09-15 PROCEDURE — ZZZZZ: CPT

## 2022-09-15 PROCEDURE — 99213 OFFICE O/P EST LOW 20 MIN: CPT

## 2022-09-22 ENCOUNTER — APPOINTMENT (OUTPATIENT)
Dept: ANTEPARTUM | Facility: CLINIC | Age: 22
End: 2022-09-22

## 2022-09-22 ENCOUNTER — OUTPATIENT (OUTPATIENT)
Dept: OUTPATIENT SERVICES | Facility: HOSPITAL | Age: 22
LOS: 1 days | Discharge: HOME | End: 2022-09-22

## 2022-09-22 ENCOUNTER — ASOB RESULT (OUTPATIENT)
Age: 22
End: 2022-09-22

## 2022-09-22 PROCEDURE — 76818 FETAL BIOPHYS PROFILE W/NST: CPT | Mod: 26

## 2022-09-22 PROCEDURE — 76820 UMBILICAL ARTERY ECHO: CPT | Mod: 26,59

## 2022-09-29 ENCOUNTER — APPOINTMENT (OUTPATIENT)
Dept: ANTEPARTUM | Facility: CLINIC | Age: 22
End: 2022-09-29

## 2022-09-29 ENCOUNTER — ASOB RESULT (OUTPATIENT)
Age: 22
End: 2022-09-29

## 2022-09-29 ENCOUNTER — OUTPATIENT (OUTPATIENT)
Dept: OUTPATIENT SERVICES | Facility: HOSPITAL | Age: 22
LOS: 1 days | Discharge: HOME | End: 2022-09-29

## 2022-09-29 VITALS — SYSTOLIC BLOOD PRESSURE: 112 MMHG | DIASTOLIC BLOOD PRESSURE: 64 MMHG

## 2022-09-29 PROCEDURE — 76818 FETAL BIOPHYS PROFILE W/NST: CPT | Mod: 26

## 2022-09-29 PROCEDURE — 76820 UMBILICAL ARTERY ECHO: CPT | Mod: 26,59

## 2022-10-06 ENCOUNTER — APPOINTMENT (OUTPATIENT)
Dept: ANTEPARTUM | Facility: CLINIC | Age: 22
End: 2022-10-06

## 2022-10-06 ENCOUNTER — OUTPATIENT (OUTPATIENT)
Dept: OUTPATIENT SERVICES | Facility: HOSPITAL | Age: 22
LOS: 1 days | Discharge: HOME | End: 2022-10-06

## 2022-10-06 ENCOUNTER — ASOB RESULT (OUTPATIENT)
Age: 22
End: 2022-10-06

## 2022-10-06 LAB
2ND TRIMESTER DATA: NORMAL
ABO + RH PNL BLD: NORMAL
AFP PNL SERPL: NORMAL
AFP SERPL-ACNC: NORMAL
B-HCG FREE SERPL-MCNC: NORMAL
BASOPHILS # BLD AUTO: 0.01 K/UL
BASOPHILS NFR BLD AUTO: 0.2 %
BILIRUB UR QL STRIP: NORMAL
CLARITY UR: CLEAR
CLINICAL BIOCHEMIST REVIEW: NORMAL
COLLECTION METHOD: NORMAL
EOSINOPHIL # BLD AUTO: 0.18 K/UL
EOSINOPHIL NFR BLD AUTO: 2.9 %
GLUCOSE 1H P 50 G GLC PO SERPL-MCNC: 75 MG/DL
GLUCOSE UR-MCNC: NORMAL
HBV SURFACE AG SER QL: NONREACTIVE
HCG UR QL: 0.2 EU/DL
HCT VFR BLD CALC: 33.3 %
HCV AB SER QL: NONREACTIVE
HCV S/CO RATIO: 0.1 S/CO
HGB BLD-MCNC: 10.6 G/DL
HGB UR QL STRIP.AUTO: NORMAL
HIV1+2 AB SPEC QL IA.RAPID: NONREACTIVE
IMM GRANULOCYTES NFR BLD AUTO: 0.8 %
INHIBIN A SERPL-MCNC: NORMAL
KETONES UR-MCNC: NORMAL
LEUKOCYTE ESTERASE UR QL STRIP: NORMAL
LYMPHOCYTES # BLD AUTO: 1.49 K/UL
LYMPHOCYTES NFR BLD AUTO: 24.1 %
MAN DIFF?: NORMAL
MCHC RBC-ENTMCNC: 27 PG
MCHC RBC-ENTMCNC: 31.8 G/DL
MCV RBC AUTO: 84.7 FL
MEV IGG FLD QL IA: 39.2 AU/ML
MEV IGG+IGM SER-IMP: POSITIVE
MONOCYTES # BLD AUTO: 0.57 K/UL
MONOCYTES NFR BLD AUTO: 9.2 %
NEUTROPHILS # BLD AUTO: 3.87 K/UL
NEUTROPHILS NFR BLD AUTO: 62.8 %
NITRITE UR QL STRIP: NORMAL
NOTES NTD: NORMAL
PH UR STRIP: 6
PLATELET # BLD AUTO: 310 K/UL
PROT UR STRIP-MCNC: 30
RBC # BLD: 3.93 M/UL
RBC # FLD: 13.1 %
RUBV IGG FLD-ACNC: 8 INDEX
RUBV IGG SER-IMP: POSITIVE
SP GR UR STRIP: 1.03
T PALLIDUM AB SER QL IA: NEGATIVE
U ESTRIOL SERPL-SCNC: NORMAL
WBC # FLD AUTO: 6.17 K/UL

## 2022-10-06 PROCEDURE — 76816 OB US FOLLOW-UP PER FETUS: CPT | Mod: 26

## 2022-10-06 PROCEDURE — ZZZZZ: CPT

## 2022-10-06 PROCEDURE — 76819 FETAL BIOPHYS PROFIL W/O NST: CPT | Mod: 26,59

## 2022-10-09 ENCOUNTER — LABORATORY RESULT (OUTPATIENT)
Age: 22
End: 2022-10-09

## 2022-10-11 DIAGNOSIS — O36.5990 MATERNAL CARE FOR OTHER KNOWN OR SUSPECTED POOR FETAL GROWTH, UNSPECIFIED TRIMESTER, NOT APPLICABLE OR UNSPECIFIED: ICD-10-CM

## 2022-10-11 DIAGNOSIS — O99.213 OBESITY COMPLICATING PREGNANCY, THIRD TRIMESTER: ICD-10-CM

## 2022-10-11 DIAGNOSIS — Z3A.36 36 WEEKS GESTATION OF PREGNANCY: ICD-10-CM

## 2022-10-13 ENCOUNTER — APPOINTMENT (OUTPATIENT)
Dept: ANTEPARTUM | Facility: CLINIC | Age: 22
End: 2022-10-13

## 2022-10-20 ENCOUNTER — APPOINTMENT (OUTPATIENT)
Dept: ANTEPARTUM | Facility: CLINIC | Age: 22
End: 2022-10-20

## 2022-10-20 ENCOUNTER — ASOB RESULT (OUTPATIENT)
Age: 22
End: 2022-10-20

## 2022-10-20 ENCOUNTER — OUTPATIENT (OUTPATIENT)
Dept: OUTPATIENT SERVICES | Facility: HOSPITAL | Age: 22
LOS: 1 days | Discharge: HOME | End: 2022-10-20

## 2022-10-20 PROCEDURE — 76819 FETAL BIOPHYS PROFIL W/O NST: CPT | Mod: 26

## 2022-10-24 ENCOUNTER — APPOINTMENT (OUTPATIENT)
Dept: OBGYN | Facility: CLINIC | Age: 22
End: 2022-10-24

## 2022-10-24 VITALS
SYSTOLIC BLOOD PRESSURE: 120 MMHG | BODY MASS INDEX: 37.13 KG/M2 | WEIGHT: 245 LBS | DIASTOLIC BLOOD PRESSURE: 80 MMHG | HEIGHT: 68 IN

## 2022-10-24 PROCEDURE — 99213 OFFICE O/P EST LOW 20 MIN: CPT

## 2022-10-27 ENCOUNTER — APPOINTMENT (OUTPATIENT)
Dept: ANTEPARTUM | Facility: CLINIC | Age: 22
End: 2022-10-27

## 2022-10-31 ENCOUNTER — APPOINTMENT (OUTPATIENT)
Dept: OBGYN | Facility: CLINIC | Age: 22
End: 2022-10-31

## 2022-11-02 ENCOUNTER — INPATIENT (INPATIENT)
Facility: HOSPITAL | Age: 22
LOS: 1 days | Discharge: HOME | End: 2022-11-04
Attending: OBSTETRICS & GYNECOLOGY | Admitting: OBSTETRICS & GYNECOLOGY

## 2022-11-02 LAB
AMPHET UR-MCNC: NEGATIVE — SIGNIFICANT CHANGE UP
APPEARANCE UR: CLEAR — SIGNIFICANT CHANGE UP
BACTERIA # UR AUTO: NEGATIVE — SIGNIFICANT CHANGE UP
BARBITURATES UR SCN-MCNC: NEGATIVE — SIGNIFICANT CHANGE UP
BASOPHILS # BLD AUTO: 0.02 K/UL — SIGNIFICANT CHANGE UP (ref 0–0.2)
BASOPHILS NFR BLD AUTO: 0.2 % — SIGNIFICANT CHANGE UP (ref 0–1)
BENZODIAZ UR-MCNC: NEGATIVE — SIGNIFICANT CHANGE UP
BILIRUB UR-MCNC: NEGATIVE — SIGNIFICANT CHANGE UP
BLD GP AB SCN SERPL QL: SIGNIFICANT CHANGE UP
BUPRENORPHINE SCREEN, URINE RESULT: NEGATIVE — SIGNIFICANT CHANGE UP
COCAINE METAB.OTHER UR-MCNC: NEGATIVE — SIGNIFICANT CHANGE UP
COLOR SPEC: YELLOW — SIGNIFICANT CHANGE UP
DIFF PNL FLD: NEGATIVE — SIGNIFICANT CHANGE UP
EOSINOPHIL # BLD AUTO: 0.33 K/UL — SIGNIFICANT CHANGE UP (ref 0–0.7)
EOSINOPHIL NFR BLD AUTO: 4.1 % — SIGNIFICANT CHANGE UP (ref 0–8)
EPI CELLS # UR: 12 /HPF — HIGH (ref 0–5)
FENTANYL UR QL: NEGATIVE — SIGNIFICANT CHANGE UP
GLUCOSE UR QL: NEGATIVE — SIGNIFICANT CHANGE UP
HCT VFR BLD CALC: 31.3 % — LOW (ref 37–47)
HGB BLD-MCNC: 9.9 G/DL — LOW (ref 12–16)
HIV 1 & 2 AB SERPL IA.RAPID: SIGNIFICANT CHANGE UP
HYALINE CASTS # UR AUTO: 8 /LPF — HIGH (ref 0–7)
IMM GRANULOCYTES NFR BLD AUTO: 0.4 % — HIGH (ref 0.1–0.3)
KETONES UR-MCNC: NEGATIVE — SIGNIFICANT CHANGE UP
L&D DRUG SCREEN, URINE: SIGNIFICANT CHANGE UP
LEUKOCYTE ESTERASE UR-ACNC: NEGATIVE — SIGNIFICANT CHANGE UP
LYMPHOCYTES # BLD AUTO: 1.72 K/UL — SIGNIFICANT CHANGE UP (ref 1.2–3.4)
LYMPHOCYTES # BLD AUTO: 21.4 % — SIGNIFICANT CHANGE UP (ref 20.5–51.1)
MCHC RBC-ENTMCNC: 25.4 PG — LOW (ref 27–31)
MCHC RBC-ENTMCNC: 31.6 G/DL — LOW (ref 32–37)
MCV RBC AUTO: 80.3 FL — LOW (ref 81–99)
METHADONE UR-MCNC: NEGATIVE — SIGNIFICANT CHANGE UP
MONOCYTES # BLD AUTO: 0.47 K/UL — SIGNIFICANT CHANGE UP (ref 0.1–0.6)
MONOCYTES NFR BLD AUTO: 5.8 % — SIGNIFICANT CHANGE UP (ref 1.7–9.3)
NEUTROPHILS # BLD AUTO: 5.47 K/UL — SIGNIFICANT CHANGE UP (ref 1.4–6.5)
NEUTROPHILS NFR BLD AUTO: 68.1 % — SIGNIFICANT CHANGE UP (ref 42.2–75.2)
NITRITE UR-MCNC: NEGATIVE — SIGNIFICANT CHANGE UP
NRBC # BLD: 0 /100 WBCS — SIGNIFICANT CHANGE UP (ref 0–0)
OPIATES UR-MCNC: NEGATIVE — SIGNIFICANT CHANGE UP
OXYCODONE UR-MCNC: NEGATIVE — SIGNIFICANT CHANGE UP
PCP UR-MCNC: NEGATIVE — SIGNIFICANT CHANGE UP
PH UR: 6.5 — SIGNIFICANT CHANGE UP (ref 5–8)
PLATELET # BLD AUTO: 271 K/UL — SIGNIFICANT CHANGE UP (ref 130–400)
PRENATAL SYPHILIS TEST: SIGNIFICANT CHANGE UP
PROPOXYPHENE QUALITATIVE URINE RESULT: NEGATIVE — SIGNIFICANT CHANGE UP
PROT UR-MCNC: ABNORMAL
RBC # BLD: 3.9 M/UL — LOW (ref 4.2–5.4)
RBC # FLD: 15.2 % — HIGH (ref 11.5–14.5)
RBC CASTS # UR COMP ASSIST: 5 /HPF — HIGH (ref 0–4)
SARS-COV-2 RNA SPEC QL NAA+PROBE: SIGNIFICANT CHANGE UP
SP GR SPEC: 1.03 — SIGNIFICANT CHANGE UP (ref 1.01–1.03)
UROBILINOGEN FLD QL: ABNORMAL
WBC # BLD: 8.04 K/UL — SIGNIFICANT CHANGE UP (ref 4.8–10.8)
WBC # FLD AUTO: 8.04 K/UL — SIGNIFICANT CHANGE UP (ref 4.8–10.8)
WBC UR QL: 4 /HPF — SIGNIFICANT CHANGE UP (ref 0–5)

## 2022-11-02 PROCEDURE — 59409 OBSTETRICAL CARE: CPT | Mod: U9

## 2022-11-02 RX ORDER — SIMETHICONE 80 MG/1
80 TABLET, CHEWABLE ORAL EVERY 4 HOURS
Refills: 0 | Status: DISCONTINUED | OUTPATIENT
Start: 2022-11-02 | End: 2022-11-04

## 2022-11-02 RX ORDER — BENZOCAINE 10 %
1 GEL (GRAM) MUCOUS MEMBRANE EVERY 6 HOURS
Refills: 0 | Status: DISCONTINUED | OUTPATIENT
Start: 2022-11-02 | End: 2022-11-04

## 2022-11-02 RX ORDER — FENTANYL/BUPIVACAINE/NS/PF 2MCG/ML-.1
250 PLASTIC BAG, INJECTION (ML) INJECTION
Refills: 0 | Status: DISCONTINUED | OUTPATIENT
Start: 2022-11-02 | End: 2022-11-02

## 2022-11-02 RX ORDER — HYDROCORTISONE 1 %
1 OINTMENT (GRAM) TOPICAL EVERY 6 HOURS
Refills: 0 | Status: DISCONTINUED | OUTPATIENT
Start: 2022-11-02 | End: 2022-11-04

## 2022-11-02 RX ORDER — SODIUM CHLORIDE 9 MG/ML
3 INJECTION INTRAMUSCULAR; INTRAVENOUS; SUBCUTANEOUS EVERY 8 HOURS
Refills: 0 | Status: DISCONTINUED | OUTPATIENT
Start: 2022-11-02 | End: 2022-11-04

## 2022-11-02 RX ORDER — CHLORHEXIDINE GLUCONATE 213 G/1000ML
1 SOLUTION TOPICAL ONCE
Refills: 0 | Status: DISCONTINUED | OUTPATIENT
Start: 2022-11-02 | End: 2022-11-02

## 2022-11-02 RX ORDER — MAGNESIUM HYDROXIDE 400 MG/1
30 TABLET, CHEWABLE ORAL
Refills: 0 | Status: DISCONTINUED | OUTPATIENT
Start: 2022-11-02 | End: 2022-11-04

## 2022-11-02 RX ORDER — ONDANSETRON 8 MG/1
4 TABLET, FILM COATED ORAL EVERY 6 HOURS
Refills: 0 | Status: DISCONTINUED | OUTPATIENT
Start: 2022-11-02 | End: 2022-11-02

## 2022-11-02 RX ORDER — OXYCODONE HYDROCHLORIDE 5 MG/1
5 TABLET ORAL ONCE
Refills: 0 | Status: DISCONTINUED | OUTPATIENT
Start: 2022-11-02 | End: 2022-11-04

## 2022-11-02 RX ORDER — OXYTOCIN 10 UNIT/ML
333.33 VIAL (ML) INJECTION
Qty: 20 | Refills: 0 | Status: DISCONTINUED | OUTPATIENT
Start: 2022-11-02 | End: 2022-11-02

## 2022-11-02 RX ORDER — LANOLIN
1 OINTMENT (GRAM) TOPICAL EVERY 6 HOURS
Refills: 0 | Status: DISCONTINUED | OUTPATIENT
Start: 2022-11-02 | End: 2022-11-04

## 2022-11-02 RX ORDER — ACETAMINOPHEN 500 MG
975 TABLET ORAL
Refills: 0 | Status: DISCONTINUED | OUTPATIENT
Start: 2022-11-02 | End: 2022-11-04

## 2022-11-02 RX ORDER — PRAMOXINE HYDROCHLORIDE 150 MG/15G
1 AEROSOL, FOAM RECTAL EVERY 4 HOURS
Refills: 0 | Status: DISCONTINUED | OUTPATIENT
Start: 2022-11-02 | End: 2022-11-04

## 2022-11-02 RX ORDER — DIBUCAINE 1 %
1 OINTMENT (GRAM) RECTAL EVERY 6 HOURS
Refills: 0 | Status: DISCONTINUED | OUTPATIENT
Start: 2022-11-02 | End: 2022-11-04

## 2022-11-02 RX ORDER — OXYTOCIN 10 UNIT/ML
2 VIAL (ML) INJECTION
Qty: 30 | Refills: 0 | Status: DISCONTINUED | OUTPATIENT
Start: 2022-11-02 | End: 2022-11-02

## 2022-11-02 RX ORDER — SODIUM CHLORIDE 9 MG/ML
1000 INJECTION, SOLUTION INTRAVENOUS
Refills: 0 | Status: DISCONTINUED | OUTPATIENT
Start: 2022-11-02 | End: 2022-11-02

## 2022-11-02 RX ORDER — TETANUS TOXOID, REDUCED DIPHTHERIA TOXOID AND ACELLULAR PERTUSSIS VACCINE, ADSORBED 5; 2.5; 8; 8; 2.5 [IU]/.5ML; [IU]/.5ML; UG/.5ML; UG/.5ML; UG/.5ML
0.5 SUSPENSION INTRAMUSCULAR ONCE
Refills: 0 | Status: DISCONTINUED | OUTPATIENT
Start: 2022-11-02 | End: 2022-11-04

## 2022-11-02 RX ORDER — NALOXONE HYDROCHLORIDE 4 MG/.1ML
0.1 SPRAY NASAL
Refills: 0 | Status: DISCONTINUED | OUTPATIENT
Start: 2022-11-02 | End: 2022-11-02

## 2022-11-02 RX ORDER — OXYCODONE HYDROCHLORIDE 5 MG/1
5 TABLET ORAL
Refills: 0 | Status: DISCONTINUED | OUTPATIENT
Start: 2022-11-02 | End: 2022-11-04

## 2022-11-02 RX ORDER — AER TRAVELER 0.5 G/1
1 SOLUTION RECTAL; TOPICAL EVERY 4 HOURS
Refills: 0 | Status: DISCONTINUED | OUTPATIENT
Start: 2022-11-02 | End: 2022-11-04

## 2022-11-02 RX ORDER — DIPHENHYDRAMINE HCL 50 MG
25 CAPSULE ORAL EVERY 6 HOURS
Refills: 0 | Status: DISCONTINUED | OUTPATIENT
Start: 2022-11-02 | End: 2022-11-04

## 2022-11-02 RX ORDER — OXYTOCIN 10 UNIT/ML
333.33 VIAL (ML) INJECTION
Qty: 20 | Refills: 0 | Status: DISCONTINUED | OUTPATIENT
Start: 2022-11-02 | End: 2022-11-04

## 2022-11-02 RX ORDER — DEXAMETHASONE 0.5 MG/5ML
4 ELIXIR ORAL EVERY 6 HOURS
Refills: 0 | Status: DISCONTINUED | OUTPATIENT
Start: 2022-11-02 | End: 2022-11-02

## 2022-11-02 RX ADMIN — Medication 975 MILLIGRAM(S): at 22:30

## 2022-11-02 RX ADMIN — SODIUM CHLORIDE 125 MILLILITER(S): 9 INJECTION, SOLUTION INTRAVENOUS at 12:15

## 2022-11-02 RX ADMIN — SODIUM CHLORIDE 125 MILLILITER(S): 9 INJECTION, SOLUTION INTRAVENOUS at 19:27

## 2022-11-02 RX ADMIN — Medication 975 MILLIGRAM(S): at 22:22

## 2022-11-02 RX ADMIN — Medication 2 MILLIUNIT(S)/MIN: at 19:27

## 2022-11-02 NOTE — PROCEDURE NOTE - ADDITIONAL PROCEDURE DETAILS
Thorough discussion of patient's history, as indicated above.  Discussed risks of epidural, including PDPH, inadequate analgesia occasionally requiring epidural catheter replacement, bleeding, infection and spinal cord injury.  Patient expressed understanding of these risks, signed informed consent and wishes to proceed with epidural catheter insertion.  Lumbar epidural performed at L3-4. Standard ASA monitors including BP, pulse oximetry, FHR. Sterile gloves, chlorhexidine prep. 1% lidocaine for local infiltration. 17g Touhy. BRIDGETTE to saline at 6 cm.  Epidural catheter threaded easily to 12 cm. Touhy needle removed. Catheter secured in place. Aspiration negative for blood/CSF. Test dose consisting of 3ml 1.5% lidocaine with epinephrine was negative. Remaining 2ml of test dose consisting of 1.5% lidocaine with epinephrine and 10ml of 0.25% bupiv given incrementally after negative aspiration. Patient tolerated procedure, was hemodynamically stable throughout and did not complain of pain or paresthesias. T10 level bilaterally. Epidural infusion consisting of 250ml 0.0625% bupivacaine with fentanyl 2mcg/ml infusing at 10ml/hr. Thorough discussion of patient's history, as indicated above.  Discussed risks of epidural, including PDPH, inadequate analgesia occasionally requiring epidural catheter replacement, bleeding, infection and spinal cord injury.  Patient expressed understanding of these risks, signed informed consent and wishes to proceed with epidural catheter insertion.  Lumbar epidural performed at L3-4. Standard ASA monitors including BP, pulse oximetry, FHR. Sterile gloves, chlorhexidine prep. 1% lidocaine for local infiltration. 17g Touhy. BRIDGETTE to saline at 6 cm.  Epidural catheter threaded easily to 12 cm. Touhy needle removed. Catheter secured in place. Aspiration negative for blood/CSF. Test dose consisting of 3ml 1.5% lidocaine with epinephrine was negative. Remaining 2ml of test dose consisting of 1.5% lidocaine with epinephrine and 10ml of 0.25% bupiv given incrementally after negative aspiration. Patient tolerated procedure, was hemodynamically stable throughout and did not complain of pain or paresthesias. T10 level bilaterally. Epidural infusion consisting of 250ml 0.0625% bupivacaine with fentanyl 2mcg/ml infusing at 10ml/hr.    Post Labor  Epidural/ Delivery  Evaluation Note:    Uncomplicated anesthetic for Vaginal Delivery.    Patient seen at bedside. Epidural to be removed by RN before patient transfer.  Patient moving B/L lower extremities.  Motor block appropriate and resolving. Vital Signs are stable. Pain well controlled.     Clint Score greater than 9    Mental Status:  __x__ Awake   ___x__ Alert   _____ Drowsy   _____ Sedated    Nausea/Vomiting:  __x__ NO  ______Yes,   See Post - Op Orders          Pain Scale (0-10):  _____    Treatment: __X__ None       Plan: Discharge:   ____Home       __X___Floor     _____Critical Care    _____

## 2022-11-02 NOTE — PROGRESS NOTE ADULT - SUBJECTIVE AND OBJECTIVE BOX
PGY 4 Note    Patient seen at bedside for evaluation of labor progression. Comfortable s/p epidural. AROM heavy mec.    Vital Signs Last 24 Hrs  T(C): 36.5 (2022 12:26), Max: 36.5 (2022 11:32)  T(F): 97.7 (2022 12:26), Max: 97.7 (2022 11:32)  HR: 69 (2022 16:26) (63 - 111)  BP: 116/61 (2022 16:26) (111/74 - 129/80)  RR: 18 (2022 12:26) (18 - 18)  SpO2: 100% (2022 16:29) (91% - 100%)      EFM: 130/mod/+accel, cat I  TOCO: q2m  SVE: 6/80/-2 @1600 AROM heavy med    Labs:                        9.9    8.04  )-----------( 271      ( 2022 12:30 )             31.3           ABO RH Interpretation: B POS (22 @ 12:37)    Urinalysis Basic - ( 2022 12:30 )    Color: Yellow / Appearance: Clear / S.027 / pH: x  Gluc: x / Ketone: Negative  / Bili: Negative / Urobili: 3 mg/dL   Blood: x / Protein: 30 mg/dL / Nitrite: Negative   Leuk Esterase: Negative / RBC: 5 /HPF / WBC 4 /HPF   Sq Epi: x / Non Sq Epi: 12 /HPF / Bacteria: Negative    HIV NR    Meds: chlorhexidine 2% Cloths 1 Application(s) Topical once  fentanyl (2 MICROgram(s)/mL) + bupivacaine 0.0625%  in 0.9% Sodium Chloride PCEA 250 milliLiter(s) Epidural PCA Continuous, started @1120  lactated ringers. 1000 milliLiter(s) IV Continuous <Continuous>  oxytocin Infusion 333.333 milliUNIT(s)/Min IV Continuous <Continuous>

## 2022-11-02 NOTE — OB RN DELIVERY SUMMARY - NSSELHIDDEN_OBGYN_ALL_OB_FT
[[email or web address]_OB_FT:[email or web address]],[NS_DeliveryRN_OBGYN_ALL_OB_FT:[id number]],[NS_CirculateRN2_OBGYN_ALL_OB_FT:[id number]=]

## 2022-11-02 NOTE — OB PROVIDER H&P - ASSESSMENT
21yo  at 40w2d, KAMERON 10/31/22, dated by 1st trimester sonogram, GBS neg, in labor    - admit to L&D  - clear liquid diet  - IVF hydration  - continuous ECM/toco  - monitor vitals  - pain management prn  - f/u admission labs and covid swab     aware

## 2022-11-02 NOTE — PROGRESS NOTE ADULT - ASSESSMENT
A/P:  23yo  at 40w2d, GBS neg, in labor, s/p epidural, s/p AROM heavy mec.    -cont EFM/toco  -clear liquid diet, IVF  -pain management with epidural  -f/u UDS    Dr. Smiley aware.

## 2022-11-02 NOTE — OB PROVIDER H&P - NSHPLABSRESULTS_GEN_ALL_CORE
GCT 75    Sonograms:   10/6 vertex, posterior placenta, MVP 5.67cm, BPP 8/8, EFW 2965g (56%), AC 68%  8/23 vertex, post placenta, MVP 5.01cm, BPP 8/8, EFW 1422g (21%), AC <3%, no major malformations, normal UA dopplers

## 2022-11-02 NOTE — PROGRESS NOTE ADULT - SUBJECTIVE AND OBJECTIVE BOX
PGY3 Note    S: Patient seen and examined at bedside. Doing well, pain well-controlled with epidural.    Vital Signs Last 24 Hrs  T(C): 36.5 (2022 12:26), Max: 36.5 (2022 11:32)  T(F): 97.7 (2022 12:26), Max: 97.7 (2022 11:32)  HR: 66 (2022 18:41) (63 - 111)  BP: 111/55 (2022 18:41) (111/55 - 129/80)  RR: 18 (2022 12:26) (18 - 18)  SpO2: 100% (2022 18:39) (91% - 100%)    EFM: 130/mod/+Accels  TOCO: q4m  SVE: 6/80/-2, vertex, ruptured    Labs:                        9.9    8.04  )-----------( 271      ( 2022 12:30 )             31.3     ABO RH Interpretation: B POS (22 @ 12:37)    Urinalysis Basic - ( 2022 12:30 )    Color: Yellow / Appearance: Clear / S.027 / pH: x  Gluc: x / Ketone: Negative  / Bili: Negative / Urobili: 3 mg/dL   Blood: x / Protein: 30 mg/dL / Nitrite: Negative   Leuk Esterase: Negative / RBC: 5 /HPF / WBC 4 /HPF   Sq Epi: x / Non Sq Epi: 12 /HPF / Bacteria: Negative    Prenatal Syphilis Test: Nonreact (22 @ 12:30)    UDS: neg  Covid: neg    Meds:  Epi: @1230

## 2022-11-02 NOTE — OB RN DELIVERY SUMMARY - NS_CORDBLDGAREASA_OBGYN_ALL_OB
infection. ? Keep your skin soft. Use moisturizing skin cream to keep the skin on your feet soft and prevent calluses and cracks. But do not put the cream between your toes, and stop using any cream that causes a rash. ? Clean underneath your toenails carefully. Do not use a sharp object to clean underneath your toenails. Use the blunt end of a nail file or other rounded tool. ? Trim and file your toenails straight across to prevent ingrown toenails. Use a nail clipper, not scissors. Use an emery board to smooth the edges. · Change socks daily. Socks without seams are best, because seams often rub the feet. You can find socks for people with diabetes from specialty catalogs. · Look inside your shoes every day for things like gravel or torn linings, which could cause blisters or sores. · Buy shoes that fit well:  ? Look for shoes that have plenty of space around the toes. This helps prevent bunions and blisters. ? Try on shoes while wearing the kind of socks you will usually wear with the shoes. ? Avoid plastic shoes. They may rub your feet and cause blisters. Good shoes should be made of materials that are flexible and breathable, such as leather or cloth. ? Break in new shoes slowly by wearing them for no more than an hour a day for several days. Take extra time to check your feet for red areas, blisters, or other problems after you wear new shoes. · Do not go barefoot. Do not wear sandals, and do not wear shoes with very thin soles. Thin soles are easy to puncture. They also do not protect your feet from hot pavement or cold weather. · Have your doctor check your feet during each visit. If you have a foot problem, see your doctor. Do not try to treat an early foot problem at home. Home remedies or treatments that you can buy without a prescription (such as corn removers) can be harmful. · Always get early treatment for foot problems.  A minor irritation can lead to a major problem if not properly cared Quantity not sufficient

## 2022-11-02 NOTE — PROGRESS NOTE ADULT - ASSESSMENT
A/P:  23yo  at 40w2d, GBS neg, in labor, s/p epidural, s/p AROM heavy mec, doing well    - will start pitocin 2x2 for augmentation  - Cont EFM/[place]  - IV Hydration  - Pain Management prn  - Monitor vitals  - Clear Liquids    Dr. huizar aware

## 2022-11-02 NOTE — OB RN TRIAGE NOTE - FALL HARM RISK - UNIVERSAL INTERVENTIONS
Bed in lowest position, wheels locked, appropriate side rails in place/Call bell, personal items and telephone in reach/Instruct patient to call for assistance before getting out of bed or chair/Non-slip footwear when patient is out of bed/[place] to call system/Physically safe environment - no spills, clutter or unnecessary equipment/Purposeful Proactive Rounding/Room/bathroom lighting operational, light cord in reach

## 2022-11-02 NOTE — OB PROVIDER H&P - NSHPPHYSICALEXAM_GEN_ALL_CORE
Vital Signs Last 24 Hrs  T(C): 36.5 (02 Nov 2022 11:32), Max: 36.5 (02 Nov 2022 11:32)  T(F): 97.7 (02 Nov 2022 11:32), Max: 97.7 (02 Nov 2022 11:32)  HR: 88 (02 Nov 2022 11:55) (88 - 105)  BP: 129/80 (02 Nov 2022 11:55) (119/65 - 129/80)  RR: 18 (02 Nov 2022 11:32) (18 - 18)    Gen: AOx3, NAD  Abd: soft, gravid, nontender, palpable contractions  Ext: no swelling    EFM: 135/mod max/+accels   [place]: q2min   SVE: 4/80/-2/vtx/intact

## 2022-11-02 NOTE — OB RN PATIENT PROFILE - FALL HARM RISK - UNIVERSAL INTERVENTIONS
Bed in lowest position, wheels locked, appropriate side rails in place/Call bell, personal items and telephone in reach/Instruct patient to call for assistance before getting out of bed or chair/Non-slip footwear when patient is out of bed/[place] to call system/Physically safe environment - no spills, clutter or unnecessary equipment/Purposeful Proactive Rounding/Room/bathroom lighting operational, light cord in reach

## 2022-11-02 NOTE — OB PROVIDER H&P - HISTORY OF PRESENT ILLNESS
21yo  at 40w2d, KAMERON 10/31/22, dated by 1st trimester sonogram, here for contractions that started at 7am, felt every 10 min, rated 8 out of 10 in intensity. Denies vaginal bleeding or leakage of fluid. Reports good fetal movement. Was followed by MFM previously for AC<10%ile, which resolved on last sonogram on 10/24. No complications during this pregnancy. GBS neg. Last SVE 1 week ago: 2cm dilated.

## 2022-11-02 NOTE — OB PROVIDER DELIVERY SUMMARY - NSSELHIDDEN_OBGYN_ALL_OB_FT
[NS_DeliveryAttending1_OBGYN_ALL_OB_FT:[email or web address]],[NS_DeliveryRN_OBGYN_ALL_OB_FT:[id number]],[NS_CirculateRN2_OBGYN_ALL_OB_FT:[id number]]

## 2022-11-02 NOTE — OB PROVIDER DELIVERY SUMMARY - NSPROVIDERDELIVERYNOTE_OBGYN_ALL_OB_FT
Patient was fully dilated and pushing. Fetal head was OA and restituted to ROT. The anterior and posterior shoulders delivered, followed by the remaining body atraumatically. The  was handed to the mother and RN or to the pediatric team. Delayed cord clamping was performed, and then clamped and cut. Cord blood gases collected x2. The placenta delivered intact with membranes. Pitocin was administered. Uterus massaged, fundus found to be firm. Cervix, vagina and perineum inspected no lacerations noted.     Viable male infant delivered,  APGARs 9/9      Dr. Pleitez and Dr. Antoine at bedside

## 2022-11-02 NOTE — PROCEDURE NOTE - PRACTITIONER PERFORMING THE TIME OUT
Patient remains intrusive and hyper verbal. Loud and argumentative with peers and staff. Irritable. Patient was disruptive in dayroom. Patient had to be removed. Patient was given ativan 2mg IM. Patient placed in open seclusion. Will continue to monitor patient and assess needs. Dinesh

## 2022-11-03 ENCOUNTER — APPOINTMENT (OUTPATIENT)
Dept: ANTEPARTUM | Facility: CLINIC | Age: 22
End: 2022-11-03

## 2022-11-03 LAB
BASOPHILS # BLD AUTO: 0.02 K/UL — SIGNIFICANT CHANGE UP (ref 0–0.2)
BASOPHILS NFR BLD AUTO: 0.2 % — SIGNIFICANT CHANGE UP (ref 0–1)
EOSINOPHIL # BLD AUTO: 0.32 K/UL — SIGNIFICANT CHANGE UP (ref 0–0.7)
EOSINOPHIL NFR BLD AUTO: 3 % — SIGNIFICANT CHANGE UP (ref 0–8)
HCT VFR BLD CALC: 26.8 % — LOW (ref 37–47)
HGB BLD-MCNC: 8.6 G/DL — LOW (ref 12–16)
IMM GRANULOCYTES NFR BLD AUTO: 0.5 % — HIGH (ref 0.1–0.3)
LYMPHOCYTES # BLD AUTO: 19.8 % — LOW (ref 20.5–51.1)
LYMPHOCYTES # BLD AUTO: 2.13 K/UL — SIGNIFICANT CHANGE UP (ref 1.2–3.4)
MCHC RBC-ENTMCNC: 25.7 PG — LOW (ref 27–31)
MCHC RBC-ENTMCNC: 32.1 G/DL — SIGNIFICANT CHANGE UP (ref 32–37)
MCV RBC AUTO: 80.2 FL — LOW (ref 81–99)
MONOCYTES # BLD AUTO: 0.98 K/UL — HIGH (ref 0.1–0.6)
MONOCYTES NFR BLD AUTO: 9.1 % — SIGNIFICANT CHANGE UP (ref 1.7–9.3)
NEUTROPHILS # BLD AUTO: 7.27 K/UL — HIGH (ref 1.4–6.5)
NEUTROPHILS NFR BLD AUTO: 67.4 % — SIGNIFICANT CHANGE UP (ref 42.2–75.2)
NRBC # BLD: 0 /100 WBCS — SIGNIFICANT CHANGE UP (ref 0–0)
PLATELET # BLD AUTO: 247 K/UL — SIGNIFICANT CHANGE UP (ref 130–400)
RBC # BLD: 3.34 M/UL — LOW (ref 4.2–5.4)
RBC # FLD: 15.1 % — HIGH (ref 11.5–14.5)
WBC # BLD: 10.77 K/UL — SIGNIFICANT CHANGE UP (ref 4.8–10.8)
WBC # FLD AUTO: 10.77 K/UL — SIGNIFICANT CHANGE UP (ref 4.8–10.8)

## 2022-11-03 PROCEDURE — 99231 SBSQ HOSP IP/OBS SF/LOW 25: CPT

## 2022-11-03 RX ORDER — SODIUM CHLORIDE 0.65 %
1 AEROSOL, SPRAY (ML) NASAL DAILY
Refills: 0 | Status: DISCONTINUED | OUTPATIENT
Start: 2022-11-03 | End: 2022-11-04

## 2022-11-03 RX ADMIN — Medication 600 MILLIGRAM(S): at 06:46

## 2022-11-03 RX ADMIN — SODIUM CHLORIDE 3 MILLILITER(S): 9 INJECTION INTRAMUSCULAR; INTRAVENOUS; SUBCUTANEOUS at 14:33

## 2022-11-03 RX ADMIN — SODIUM CHLORIDE 3 MILLILITER(S): 9 INJECTION INTRAMUSCULAR; INTRAVENOUS; SUBCUTANEOUS at 23:54

## 2022-11-03 RX ADMIN — Medication 1 SPRAY(S): at 20:33

## 2022-11-03 RX ADMIN — Medication 975 MILLIGRAM(S): at 21:12

## 2022-11-03 RX ADMIN — Medication 975 MILLIGRAM(S): at 09:50

## 2022-11-03 RX ADMIN — SODIUM CHLORIDE 3 MILLILITER(S): 9 INJECTION INTRAMUSCULAR; INTRAVENOUS; SUBCUTANEOUS at 06:09

## 2022-11-03 RX ADMIN — Medication 975 MILLIGRAM(S): at 15:49

## 2022-11-03 RX ADMIN — Medication 975 MILLIGRAM(S): at 20:33

## 2022-11-03 NOTE — PROGRESS NOTE ADULT - SUBJECTIVE AND OBJECTIVE BOX
PGY 2 Note    Chief Complaint: Postpartum    HPI: Pt doing well, pain well controlled. No overnight events, no acute complaints.    ROS: Denies cardiovascular or respiratory symptoms    PAST MEDICAL & SURGICAL HISTORY:  No pertinent past medical history  No significant past surgical history      Physical Exam  Vital Signs Last 24 Hrs  T(F): 97.9 (02 Nov 2022 23:15), Max: 97.9 (02 Nov 2022 23:15)  HR: 69 (02 Nov 2022 23:15) (60 - 123)  BP: 105/52 (02 Nov 2022 23:15) (105/52 - 129/80)  RR: 18 (02 Nov 2022 23:15) (18 - 18)    Physical exam:  General - AAOx3, NAD  Heart - S1S2, RRR  Lungs - CTA BL  Abdomen:  - Soft, nontender, nondistended, BS+  - Fundus firm, nontender, below the umbilicus  Pelvis/Vagina - Normal Lochia  Extremities - No calf tenderness, no swelling    Labs:                        9.9    8.04  )-----------( 271      ( 02 Nov 2022 12:30 )             31.3     ABO RH Interpretation: B POS (11-02-22 @ 12:37)  Antibody Screen: NEG (11-02-22 @ 12:37)    Prenatal Syphilis Test: Nonreact (11-02-22 @ 12:30)   PGY 2 Note    Chief Complaint: Postpartum    HPI: Pt doing well, pain well controlled. No overnight events. Complaining about congestion. Denies fevers, chills, cough, SOB, CP, nausea, vomiting, diarrhea, constipation or dysuria.    ROS: Denies cardiovascular or respiratory symptoms    PAST MEDICAL & SURGICAL HISTORY:  No pertinent past medical history  No significant past surgical history      Physical Exam  Vital Signs Last 24 Hrs  T(F): 97.9 (02 Nov 2022 23:15), Max: 97.9 (02 Nov 2022 23:15)  HR: 69 (02 Nov 2022 23:15) (60 - 123)  BP: 105/52 (02 Nov 2022 23:15) (105/52 - 129/80)  RR: 18 (02 Nov 2022 23:15) (18 - 18)    Physical exam:  General - AAOx3, NAD  Heart - S1S2, RRR  Lungs - CTA BL  Abdomen:  - Soft, nontender, nondistended, BS+  - Fundus firm, nontender, below the umbilicus  Pelvis/Vagina - Normal Lochia  Extremities - No calf tenderness, no swelling    Labs:                        9.9    8.04  )-----------( 271      ( 02 Nov 2022 12:30 )             31.3     ABO RH Interpretation: B POS (11-02-22 @ 12:37)  Antibody Screen: NEG (11-02-22 @ 12:37)    Prenatal Syphilis Test: Nonreact (11-02-22 @ 12:30)

## 2022-11-03 NOTE — PROGRESS NOTE ADULT - ASSESSMENT
21yo P3 S/P  PPD1, h/o NSAIDs anaphylaxis, recovering well.    - encourage ambulation  - PO hydration  - Continue diet as tolerated   - Monitor vitals and bleeding  - f/u AM CBC   - no NSAIDs  - anticipate d/c home tomorrow and is instructed to follow up in 6 weeks.     Dr. Varma and Dr. Vences to be made aware 21yo P3 S/P  PPD1, h/o NSAIDs anaphylaxis, recovering well.    - encourage ambulation  - PO hydration  - Continue diet as tolerated   - Monitor vitals and bleeding  - f/u AM CBC   - no NSAIDs  - musinex ordered, saline spray ordered PRN  - anticipate d/c home tomorrow and is instructed to follow up in 6 weeks.     Dr. Varma and Dr. Vences to be made aware

## 2022-11-04 ENCOUNTER — TRANSCRIPTION ENCOUNTER (OUTPATIENT)
Age: 22
End: 2022-11-04

## 2022-11-04 VITALS
HEART RATE: 71 BPM | DIASTOLIC BLOOD PRESSURE: 63 MMHG | TEMPERATURE: 98 F | RESPIRATION RATE: 18 BRPM | SYSTOLIC BLOOD PRESSURE: 112 MMHG

## 2022-11-04 RX ORDER — ACETAMINOPHEN 500 MG
3 TABLET ORAL
Qty: 0 | Refills: 0 | DISCHARGE
Start: 2022-11-04

## 2022-11-04 RX ADMIN — SODIUM CHLORIDE 3 MILLILITER(S): 9 INJECTION INTRAMUSCULAR; INTRAVENOUS; SUBCUTANEOUS at 05:51

## 2022-11-04 RX ADMIN — Medication 975 MILLIGRAM(S): at 05:53

## 2022-11-04 NOTE — PROGRESS NOTE ADULT - SUBJECTIVE AND OBJECTIVE BOX
PGY 2 Note    Chief Complaint: Postpartum    HPI: Pt doing well, pain well controlled. No overnight events, no acute complaints. Patient reports improvement in congestion. Denies fevers, chills, SOB, CP, nausea, vomiting, diarrhea or constipation. Breast feeding without difficulty.    ROS: Denies cardiovascular or respiratory symptoms    PAST MEDICAL & SURGICAL HISTORY:  No pertinent past medical history    No significant past surgical history    Physical Exam  Vital Signs Last 24 Hrs  T(F): 96 (03 Nov 2022 23:30), Max: 98.2 (03 Nov 2022 08:25)  HR: 67 (03 Nov 2022 23:30) (67 - 88)  BP: 107/66 (03 Nov 2022 23:30) (101/55 - 111/60)  RR: 18 (03 Nov 2022 23:30) (18 - 18)    Physical exam:  General - AAOx3, NAD  Heart - S1S2, RRR  Lungs - CTA BL  Abdomen:  - Soft, nontender, nondistended, BS+  - Fundus firm, nontender, below the umbilicus  Pelvis/Vagina - Normal Lochia  Extremities - No calf tenderness, no swelling    Labs:                        8.6    10.77 )-----------( 247      ( 03 Nov 2022 07:34 )             26.8                         9.9    8.04  )-----------( 271      ( 02 Nov 2022 12:30 )             31.3     ABO RH Interpretation: B POS (11-02-22 @ 12:37)  Antibody Screen: NEG (11-02-22 @ 12:37)

## 2022-11-04 NOTE — DISCHARGE NOTE OB - MEDICATION SUMMARY - MEDICATIONS TO TAKE
I will START or STAY ON the medications listed below when I get home from the hospital:    acetaminophen 325 mg oral tablet  -- 3 tab(s) by mouth every 6 hours  -- Indication: For pain as needed

## 2022-11-04 NOTE — DISCHARGE NOTE OB - CARE PROVIDER_API CALL
Javier Pablo)  Obstetrics and Gynecology  [address] [place] [address]  Phone: [phone]  Fax: [phone]  Follow Up Time:

## 2022-11-04 NOTE — DISCHARGE NOTE OB - PATIENT PORTAL LINK FT
You can access the FollowMyHealth Patient Portal offered by [facility] by registering at the following website: [email or web address]. By joining [facility]’s FollowMyHealth portal, you will also be able to view your health information using other applications (apps) compatible with our system.

## 2022-11-04 NOTE — PROGRESS NOTE ADULT - ASSESSMENT
21yo P3 S/P  PPD2, h/o NSAIDs anaphylaxis, recovering well.    - encourage ambulation  - PO hydration  - Continue diet as tolerated   - Monitor vitals and bleeding  - no NSAIDs  - anticipate d/c home today and is instructed to follow up in 6 weeks.     Dr. Varma and Dr. Smiley to be made aware

## 2022-11-04 NOTE — DISCHARGE NOTE OB - HOSPITAL COURSE
22 @ 05:56    HPI:  21yo  at 40w2d, K[facility] 10/31/22, dated by 1st trimester sonogram, here for contractions that started at 7am, felt every 10 min, rated 8 out of 10 in intensity. Denies vaginal bleeding or leakage of fluid. Reports good fetal movement. Was followed by M previously for AC<10%ile, which resolved on last sonogram on 10/24. No complications during this pregnancy. GBS neg. Last SVE 1 week ago: 2cm dilated.   (2022 12:21)      PAST MEDICAL & SURGICAL HISTORY:  No pertinent past medical history      No significant past surgical history          POST PARTUM COURSE:          LABS:                        8.6    10.77 )-----------( 247      ( 2022 07:34 )             26.8                         9.9    8.04  )-----------( 271      ( 2022 12:30 )             31.3                   Allergies    ibuprofen (Anaphylaxis)    Intolerances

## 2022-11-04 NOTE — DISCHARGE NOTE OB - NS MD DC FALL RISK RISK
For information on Fall & Injury Prevention, visit: [email or web address] OR  [email or web address] OR  https://www.cdc.gov/steadi/patient.html

## 2022-11-07 DIAGNOSIS — Z28.09 IMMUNIZATION NOT CARRIED OUT BECAUSE OF OTHER CONTRAINDICATION: ICD-10-CM

## 2022-11-07 DIAGNOSIS — O48.0 POST-TERM PREGNANCY: ICD-10-CM

## 2022-11-07 DIAGNOSIS — Z3A.40 40 WEEKS GESTATION OF PREGNANCY: ICD-10-CM

## 2022-11-07 DIAGNOSIS — Z28.21 IMMUNIZATION NOT CARRIED OUT BECAUSE OF PATIENT REFUSAL: ICD-10-CM

## 2022-11-07 DIAGNOSIS — R09.81 NASAL CONGESTION: ICD-10-CM

## 2022-11-07 DIAGNOSIS — Z28.310 UNVACCINATED FOR COVID-19: ICD-10-CM

## 2022-11-07 DIAGNOSIS — Z88.6 ALLERGY STATUS TO ANALGESIC AGENT: ICD-10-CM

## 2022-12-15 NOTE — OB RN PATIENT PROFILE - AS SC BRADEN MOISTURE
[address] [place]     12/15/22  Monica Cárdenas : 2001 Sex: female  Age: 22 y.o. Chief Complaint   Patient presents with    Pharyngitis     Hurts to swallow; onset a couple days       HPI  Patient presents today accompanied by her boyfriend complaining of sore throat, swollen tonsils, some sinus drainage. Started yesterday. Denies fever or chills. Denies loss of taste or smell. States her roommate was diagnosed with strep in the recent past.  She does have significant history of strep. Review of Systems   Constitutional:  Negative for chills and fever. HENT:  Positive for postnasal drip and sore throat. Negative for congestion, ear pain, sinus pressure and sinus pain. Eyes: Negative. Respiratory:  Negative for cough, chest tightness, shortness of breath and wheezing. Cardiovascular:  Negative for chest pain. Gastrointestinal:  Negative for abdominal pain, diarrhea, nausea and vomiting. Musculoskeletal:  Negative for myalgias. Neurological:  Negative for headaches. REST OF PERTINENT ROS GONE OVER AND WAS NEGATIVE. Current Outpatient Medications:     amoxicillin (AMOXIL) 875 MG tablet, Take 1 tablet by mouth 2 times daily for 10 days, Disp: 20 tablet, Rfl: 0    predniSONE (DELTASONE) 10 MG tablet, Take 3 tablets by mouth daily for 2 days, THEN 2 tablets daily for 2 days, THEN 1 tablet daily for 2 days. , Disp: 12 tablet, Rfl: 0  No Known Allergies    No past medical history on file. Past Surgical History:   Procedure Laterality Date    DENTAL SURGERY       No family history on file. Social History     Socioeconomic History    Marital status: Single     Spouse name: Not on file    Number of children: Not on file    Years of education: Not on file    Highest education level: Not on file   Occupational History    Not on file   Tobacco Use    Smoking status: Never    Smokeless tobacco: Not on file   Substance and Sexual Activity    Alcohol use:  No Drug use: No    Sexual activity: Not on file   Other Topics Concern    Not on file   Social History Narrative    Not on file     Social Determinants of Health     Financial Resource Strain: Not on file   Food Insecurity: Not on file   Transportation Needs: Not on file   Physical Activity: Not on file   Stress: Not on file   Social Connections: Not on file   Intimate Partner Violence: Not on file   Housing Stability: Not on file       Vitals:    12/15/22 0906   BP: 116/74   Pulse: 92   Temp: 97.4 °F (36.3 °C)   TempSrc: Temporal   SpO2: 98%   Weight: 180 lb (81.6 kg)   Height: 5' 4\" (1.626 m)       Physical Exam  Vitals and nursing note reviewed. Constitutional:       General: She is not in acute distress. Appearance: She is well-developed. HENT:      Head: Normocephalic and atraumatic. Right Ear: Tympanic membrane, ear canal and external ear normal.      Left Ear: Tympanic membrane, ear canal and external ear normal.      Nose: Nose normal.      Mouth/Throat:      Mouth: Mucous membranes are moist.      Pharynx: Oropharynx is clear. Posterior oropharyngeal erythema present. No oropharyngeal exudate. Comments: Left tonsil area is somewhat swollen. Minimal clear mucus draining posterior pharynx  Cardiovascular:      Rate and Rhythm: Normal rate and regular rhythm. Heart sounds: Normal heart sounds. No murmur heard. Pulmonary:      Effort: Pulmonary effort is normal. No respiratory distress. Breath sounds: Normal breath sounds. No wheezing, rhonchi or rales. Musculoskeletal:      Cervical back: Normal range of motion and neck supple. No tenderness. Lymphadenopathy:      Cervical: No cervical adenopathy. Skin:     General: Skin is warm and dry. Neurological:      Mental Status: She is alert and oriented to person, place, and time. Psychiatric:         Mood and Affect: Mood normal.         Behavior: Behavior normal.         Thought Content:  Thought content normal. (4) rarely moist

## 2023-01-09 ENCOUNTER — APPOINTMENT (OUTPATIENT)
Dept: OBGYN | Facility: CLINIC | Age: 23
End: 2023-01-09
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
## 2023-02-18 ENCOUNTER — EMERGENCY (EMERGENCY)
Facility: HOSPITAL | Age: 23
LOS: 0 days | Discharge: HOME | End: 2023-02-18
Attending: EMERGENCY MEDICINE
Payer: MEDICAID

## 2023-02-18 VITALS
TEMPERATURE: 98 F | RESPIRATION RATE: 20 BRPM | DIASTOLIC BLOOD PRESSURE: 65 MMHG | OXYGEN SATURATION: 96 % | HEART RATE: 85 BPM | SYSTOLIC BLOOD PRESSURE: 121 MMHG
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
## 2023-02-18 DIAGNOSIS — S02.31XA FRACTURE OF ORBITAL FLOOR, RIGHT SIDE, INITIAL ENCOUNTER FOR CLOSED FRACTURE: ICD-10-CM

## 2023-02-18 DIAGNOSIS — F10.90 ALCOHOL USE, UNSPECIFIED, UNCOMPLICATED: ICD-10-CM

## 2023-02-18 DIAGNOSIS — O26.899 OTHER SPECIFIED PREGNANCY RELATED CONDITIONS, UNSPECIFIED TRIMESTER: ICD-10-CM
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
## 2023-02-18 DIAGNOSIS — Z3A.00 WEEKS OF GESTATION OF PREGNANCY NOT SPECIFIED: ICD-10-CM

## 2023-02-18 DIAGNOSIS — Z88.6 ALLERGY STATUS TO ANALGESIC AGENT: ICD-10-CM

## 2023-02-18 DIAGNOSIS — O9A.219 INJURY, POISONING AND CERTAIN OTHER CONSEQUENCES OF EXTERNAL CAUSES COMPLICATING PREGNANCY, UNSPECIFIED TRIMESTER: ICD-10-CM

## 2023-02-18 DIAGNOSIS — O99.310 ALCOHOL USE COMPLICATING PREGNANCY, UNSPECIFIED TRIMESTER: ICD-10-CM

## 2023-02-18 DIAGNOSIS — R51.9 HEADACHE, UNSPECIFIED: ICD-10-CM

## 2023-02-18 LAB
APPEARANCE UR: ABNORMAL
BACTERIA # UR AUTO: ABNORMAL
BILIRUB UR-MCNC: NEGATIVE — SIGNIFICANT CHANGE UP
COLOR SPEC: YELLOW — SIGNIFICANT CHANGE UP
DIFF PNL FLD: NEGATIVE — SIGNIFICANT CHANGE UP
EPI CELLS # UR: 9 /HPF — HIGH (ref 0–5)
GLUCOSE UR QL: NEGATIVE — SIGNIFICANT CHANGE UP
HCG UR QL: POSITIVE
HYALINE CASTS # UR AUTO: 7 /LPF — SIGNIFICANT CHANGE UP (ref 0–7)
KETONES UR-MCNC: ABNORMAL
LEUKOCYTE ESTERASE UR-ACNC: ABNORMAL
NITRITE UR-MCNC: NEGATIVE — SIGNIFICANT CHANGE UP
PH UR: 6.5 — SIGNIFICANT CHANGE UP (ref 5–8)
PROT UR-MCNC: ABNORMAL
RBC CASTS # UR COMP ASSIST: 1 /HPF — SIGNIFICANT CHANGE UP (ref 0–4)
SP GR SPEC: 1.03 — SIGNIFICANT CHANGE UP (ref 1.01–1.03)
UROBILINOGEN FLD QL: SIGNIFICANT CHANGE UP
WBC UR QL: 33 /HPF — HIGH (ref 0–5)

## 2023-02-18 PROCEDURE — 70450 CT HEAD/BRAIN W/O DYE: CPT | Mod: 26,MA

## 2023-02-18 PROCEDURE — 70486 CT MAXILLOFACIAL W/O DYE: CPT | Mod: MA

## 2023-02-18 PROCEDURE — 70486 CT MAXILLOFACIAL W/O DYE: CPT | Mod: 26,MA

## 2023-02-18 PROCEDURE — 81001 URINALYSIS AUTO W/SCOPE: CPT

## 2023-02-18 PROCEDURE — 99284 EMERGENCY DEPT VISIT MOD MDM: CPT | Mod: 25

## 2023-02-18 PROCEDURE — 99284 EMERGENCY DEPT VISIT MOD MDM: CPT

## 2023-02-18 PROCEDURE — 81025 URINE PREGNANCY TEST: CPT

## 2023-02-18 PROCEDURE — 70450 CT HEAD/BRAIN W/O DYE: CPT | Mod: MA

## 2023-02-18 NOTE — ED PROVIDER NOTE - PHYSICAL EXAMINATION
CONSTITUTIONAL:  in mild acute distress.   SKIN: warm, dry  HEAD: Normocephalic; atraumatic.  EYES: PERRL, EOMI, right inferior subcunjunctival hemmorrhage, vision 20/20 bilaterally, no corneal abrasion, tenderness to  right lower eyesocket.   ENT: No nasal discharge; airway clear.  NECK: Supple; non tender.  CARD:  Regular rate and rhythm.   RESP: NO inc WOB   ABD: soft ntnd  EXT: Normal ROM.    LYMPH: No acute cervical adenopathy.  NEURO: Alert, oriented, grossly unremarkable  PSYCH: Cooperative, appropriate.

## 2023-02-18 NOTE — ED ADULT TRIAGE NOTE - ARRIVAL FROM
Home Patient calm and cooperative  Vital Signs Last 24 Hrs  T(C): 36.9 (02 Nov 2018 10:39), Max: 36.9 (02 Nov 2018 10:39)  T(F): 98.4 (02 Nov 2018 10:39), Max: 98.4 (02 Nov 2018 10:39)  HR: 93 (02 Nov 2018 10:39) (93 - 93)  BP: 108/61 (02 Nov 2018 10:39) (108/61 - 108/61)  BP(mean): --  RR: --  SpO2: 98% (02 Nov 2018 10:39) (98% - 98%) none Patient lives with her mother and 3 brothers, parents recently  one year ago, are still legally . Mother has primary custody however patient visits her father every other weekend. Vital Signs Last 24 Hrs  T(C): 36.5 (25 Jan 2022 09:22), Max: 36.5 (25 Jan 2022 09:22)  T(F): 97.7 (25 Jan 2022 09:22), Max: 97.7 (25 Jan 2022 09:22)  HR: 99 (25 Jan 2022 09:22) (99 - 99)  BP: 108/71 (25 Jan 2022 09:22) (108/71 - 108/71)  BP(mean): --  RR: 18 (25 Jan 2022 09:22) (18 - 18)  SpO2: 100% (25 Jan 2022 09:22) (100% - 100%)

## 2023-02-18 NOTE — ED PROVIDER NOTE - NSFOLLOWUPINSTRUCTIONS_ED_ALL_ED_FT
please follow up with your pmd and ob gyn for your pregnancy and opthomology and ENT for your orbital fracture    Our Emergency Department Referral Coordinators will be reaching out to you in the next 24-48 hours from 9:00am to 5:00pm with a follow up appointment. Please expect a phone call from the hospital in that time frame. If you do not receive a call or if you have any questions or concerns, you can reach them at   [phone]     Orbital Floor Fracture, Blowout    The orbit, also called the eye socket, is a bony structure that protects the eye. The bottom wall of the orbit is called the orbital floor. It separates the orbit from a sinus. An orbital floor fracture is a break in the orbital floor. This type of fracture is called a "blowout" when tissues around the eye, including the muscle that is used to make the eye look down, becomes trapped within the fracture.     CAUSES  An orbital floor fracture is caused by a direct blow (blunt trauma) to the eye from the front.    SIGNS AND SYMPTOMS  A black eye.  Swelling and bruising around the eye.  A gurgling sound when pressure is placed on the eye area.   Seeing two of everything, with one object appearing higher than the other (vertical diplopia). The vertical diplopia is worse when looking up.  Pain around the eye when looking up.  One eye looks sunken compared to the other eye.  Numbness of the cheek and upper gum on the same side of the face as was injured.    DIAGNOSIS  A diagnosis is made with an eye exam. It is confirmed with X-rays or a CT scan of the eye.    TREATMENT  You may be prescribed medicines such as antibiotics, steroids, or decongestants. The fracture itself is usually not treated until all the swelling around the eye has gone away. This may take 1–2 weeks. After the swelling has gone away:    If your eye is not trapped within the fracture, you will not need treatment.  If you have persistent vertical double vision, your health care provider may try to free the muscle. If he or she cannot, you may need to have surgery.  If you have double vision, but only when looking up, your health care provider will discuss treatment options with you. Some people who do not spend a lot of time looking up choose not to have additional treatment. Others who need to look up often, such as electricians, need treatment.    HOME CARE INSTRUCTIONS  Keep all follow-up visits as directed by your health care provider. This is important.  Take medicines only as directed by your health care provider.   Follow your health care provider's instructions about:  Using ice packs or cold compresses to decrease swelling.  Sleeping with your head elevated.  Always follow recommendations about the wearing of protective glasses or goggles.  Do not wear contact lenses until your health care provider says it is okay.   Do not drive or perform your regular activities without your health care provider's approval. Be aware that if you are only using one eye to see, you may have difficulty with depth perception and the ability to  distance.  Do not blow your nose.   Stay away from bryson areas.  Avoid traveling by plane or going to high-altitude areas. This may slow the healing of your swelling and increase sinus pain.    SEEK MEDICAL CARE IF:  Your vision changes.  The redness or swelling around the injured eye does not go away or becomes worse.  Blood or discolored discharge comes from your nose.  You have a fever.    SEEK IMMEDIATE MEDICAL CARE IF:  You have a sensation that you are seeing flashing lights.  You have sudden blindness.     MAKE SURE YOU:  Understand these instructions.  Will watch your condition.  Will get help right away if you are not doing well or get worse.    ADDITIONAL NOTES AND INSTRUCTIONS    Please follow up with your Primary MD in 24-48 hr.  Seek immediate medical care for any new/worsening signs or symptoms.

## 2023-02-18 NOTE — ED ADULT NURSE REASSESSMENT NOTE - NS ED NURSE REASSESS COMMENT FT1
Patient received in room at start of shift, updated on plan of care regarding followup treatment and appointments. Discharge instructions reviewed and patient verbalized understanding at this time.

## 2023-02-18 NOTE — ED PROVIDER NOTE - PATIENT PORTAL LINK FT
You can access the FollowMyHealth Patient Portal offered by [facility] by registering at the following website: [email or web address]. By joining [employer]’s FollowMyHealth portal, you will also be able to view your health information using other applications (apps) compatible with our system.

## 2023-02-18 NOTE — ED PROVIDER NOTE - CLINICAL SUMMARY MEDICAL DECISION MAKING FREE TEXT BOX
Orbital fracture noted.  Abx given.  Plan of care instructions discussed.  DC home.  Strict return instructions discussed.
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
## 2023-02-18 NOTE — ED PROVIDER NOTE - NSFOLLOWUPCLINICS_GEN_ALL_ED_FT
[facility]  ENT  [address], 2nd [place] [address]  Phone: [phone]  Fax:     [facility]  OB/GYN  [address] [place] [address]  Phone: [phone]  Fax:     [facility]  Ophthalmolgy  [address], [address]  [place] [address]  Phone: [phone]  Fax:
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
## 2023-02-21 ENCOUNTER — APPOINTMENT (OUTPATIENT)
Dept: OBGYN | Facility: CLINIC | Age: 23
End: 2023-02-21

## 2023-04-03 ENCOUNTER — APPOINTMENT (OUTPATIENT)
Dept: OBGYN | Facility: CLINIC | Age: 23
End: 2023-04-03
Payer: MEDICAID

## 2023-04-03 PROCEDURE — 99213 OFFICE O/P EST LOW 20 MIN: CPT

## 2023-04-07 NOTE — OB PROVIDER IHI INDUCTION/AUGMENTATION NOTE - NS_IHIMETHOD_OBGYN_ALL_OB
Faxed   
Received Fax from [employer] regarding Plan of care with date of 4/5/23 and placed on Dr. Johanny Aguilar's desk/door for review.    Will document and fax to [phone] once this is seen by physician.    Thanks.    
P[employer]ocin

## 2023-04-20 ENCOUNTER — APPOINTMENT (OUTPATIENT)
Dept: OBGYN | Facility: CLINIC | Age: 23
End: 2023-04-20

## 2023-04-26 NOTE — OB RN DELIVERY SUMMARY - AMNIOTIC FLUID AMOUNT, LABOR
Cardiology Consult Note    Patient Demographics:  Name: Gwendolyn Trejo  Age:  85 y.o.  MRN:  [id number]  Admission Date: 4/25/2023    Chief Complaint upon admit:  Bradycardia          History of Present Illness     84 yo with recent history of TAVR presented with Symptomatic bradycardia. She was transferred from an outside facility after being found to have a ventricular escape rhythm on EKG. She was tired and weak at that time. Recently had BB added to medications on top of verapamil. Recent TAVR without BBB before. Previous EKGs all were atrial fibrillation.     Since being admitted her EKG has shown atrial fib with a narrow QRS and appropriate rates. No tachycardia or bradycardia. No chest pains. No shortness of breath. Patients symptoms have resolved.     Past Medical History:   Diagnosis Date    Anemia of chronic disease     Anxiety disorder     Aortic stenosis     Atrial fibrillation     CAD (coronary artery disease)     Coronary arteriosclerosis     Crushing injury of right foot     Diastolic dysfunction     Diverticulosis     History of Coumadin therapy     History of transient ischemic attack (TIA)     HTN (hypertension)     JOAQUIN (iron deficiency anemia)     Inflamed seborrheic keratosis     Insomnia     Lumbar radiculopathy     Mitral regurgitation     Neovascular age-related macular degeneration     Nodular calcific aortic valve stenosis     Pseudophakia of both eyes     Renal scarring     S/P TAVR (transcatheter aortic valve replacement) 04/20/2023    Sciatica     Severe aortic stenosis     Transient cerebral ischemia        Social History     Socioeconomic History    Marital status:    Tobacco Use    Smoking status: Never    Smokeless tobacco: Never   Substance and Sexual Activity    Alcohol use: Never    Drug use: Never    Sexual activity: Not Currently       Past Surgical History:   Procedure Laterality Date    ANKLE GANGLION CYST EXCISION Left     CARDIAC CATHETERIZATION  12/31/2013      [facility]    CATARACT EXTRACTION Bilateral      SECTION       SECTION      TRANSCATHETER AORTIC VALVE REPLACEMENT (TAVR)  2023    Dr Mauricio Horn       Family History   Problem Relation Age of Onset    Heart failure Mother     Parkinsonism Father     Valvular heart disease Sister         infant - cause of death       Allergies:   Review of patient's allergies indicates:   Allergen Reactions    Penicillins Hives       Prior to Admission medications    Medication Sig Start Date End Date Taking? Authorizing Provider   carvediloL (COREG) 3.125 MG tablet Take 1 tablet (3.125 mg total) by mouth 2 (two) times daily with meals. 3/7/23 3/6/24  Estuardo Soriano DO   clonazePAM (KLONOPIN) 0.5 MG tablet Take 1 tablet (0.5 mg total) by mouth every evening. 22  Estuardo Soriano DO   dexbrompheniramine-phenyleph (ALA-HIST PE) 2-10 mg Tab Take 1 tablet by mouth 2 (two) times daily as needed. 22   Historical Provider   ELIQUIS 5 mg Tab Take 5 mg by mouth 2 (two) times daily. 23   Historical Provider   furosemide (LASIX) 40 MG tablet TAKE ONE TABLET BY MOUTH EVERY MORNING 3/28/23   Estuardo Soriano DO   losartan (COZAAR) 100 MG tablet TAKE ONE TABLET BY MOUTH DAILY FOR BLOOD PRESSURE 23   Estuardo Soriano DO   SLOW RELEASE IRON 140 mg (45 mg iron) TbSR Take 1 tablet by mouth. 3/15/23   Historical Provider   verapamiL (CALAN-SR) 240 MG CR tablet TAKE TWO TABLETS BY MOUTH DAILY FOR BLOOD PRESSURE 4/10/23   Estuardo Soriano DO         Current Facility-Administered Medications:     furosemide tablet 40 mg, 40 mg, Oral, QAM, Marquise Potts MD, 40 mg at 23 0809    hydrALAZINE injection 10 mg, 10 mg, Intravenous, Q8H PRN, Charlie Lomas Jr., [facility], 10 mg at 23 0549    losartan tablet 100 mg, 100 mg, Oral, Daily, Marquise Potts MD, 100 mg at 23 0809    There are no hospital problems to display for this patient.    Constitutional: Negative for fever, chills, weight  loss  Eyes: Negative for changes in vision, diplopia, pain, or discharge  Ears: Negative for changes in hearing, tinnitus,  pain, or discharge,  Mouth and Throat: Negative for mouth or throat soreness, bleeding gums, lesions, petechia  Respiratory: Negative for cough, sputum production, shortness of breath  Cardiovascular: Negative for chest pain, palpitations, dyspnea on excursion, syncope    Gastrointestinal: Negative for abdominal pain, nausea, vomiting, diarrhea, constipation  Genitourinary: Negative for dysuria, hematuria, frequency, urgency or incontinence  Musculoskeletal: Negative for weakness, pain, swelling, decreased ROM  Skin: Negative for rash, pruritis, breakdown, hematoma, ecchymosis, petechia, jaundice  Neuro: Negative for headache, lightheadedness, syncope or weakness  Psyc: Negative for changes in mood or behavior, Dana SI or HI  All other systems are reviewed and are negative.    Objective     Vitals:    04/25/23 0500 04/25/23 0728 04/25/23 1124 04/25/23 1533   BP: (!) 170/89 (!) 162/73 (!) 187/78 (!) 170/95   Pulse: 86 86 92 87   Resp: 20 18  20   Temp: 97.9 °F (36.6 °C) 97.5 °F (36.4 °C) 97.6 °F (36.4 °C) 98.9 °F (37.2 °C)   TempSrc: Oral Oral Oral Oral   SpO2: (!) 94% 95% 95% (!) 94%   Height:         No intake or output data in the 24 hours ending 04/25/23 2152       General: Alert, Awake, Oriented x 3, No Acute Distress,   Head: normocephalic, atraumatic  Eyes: PERRL, EOMI, no scleral icterus, no conjunctival pallor  Nose: no tenderness to palpation, no drainage or erythema appreciated  Mouth and Throat: poor dentition, no lesions noted, moist mucus membranes  Neck: no lymphadenopathy appreciated, No carotid bruits, JVD 8 cm  Respiratory: lungs clear to ascultation bilaterally, no accessory use of muscles   Cardiovascular: regular rate with irregular rhythm, no murmurs, rubs, or S3, S4, normal apical impulse.  Gastrointestinal: soft, nontender, nondistended,no rebound or guarding,  normoactive bowel sounds.   Extremities: pulses 2+ in all extremities, no pitting edema, normal ROM   Neuro:  full strength even in all extremities, no sensory deficits.   Skin: no lesions, rashes, or breakdown.       Inpatient Diagnostics:  EKG with rate controlled a fib       Assessment     86 yo with history of PAF and AS (s/p TAVR) presenting for symptomatic bradycardia. On multiple AV tracy blocking agents on presentation. Now in a fib with controlled ventricular response.        Plan     Symptomatic bradycardia  - holding AV tracy agents  - telemetry   - PT evaluation  - may need PPM if ventricular escape returns or if she requires BB due to tachycardia     A fib  - holding eliquis incase she needs PPM   - Rate controlled without medications     AS  - s/p TAVR   - echo to assess function   - narrow complex on EKG, do not suspect heart block from TAVR       Marquise Potts    4/25/2023, 9:52 PM       within normal limits

## 2023-05-08 ENCOUNTER — APPOINTMENT (OUTPATIENT)
Dept: OBGYN | Facility: CLINIC | Age: 23
End: 2023-05-08
Payer: MEDICAID

## 2023-05-08 PROCEDURE — 99213 OFFICE O/P EST LOW 20 MIN: CPT | Mod: 25

## 2023-05-08 PROCEDURE — 93976 VASCULAR STUDY: CPT

## 2023-05-08 PROCEDURE — 76830 TRANSVAGINAL US NON-OB: CPT

## 2023-05-08 RX ORDER — NORGESTIMATE AND ETHINYL ESTRADIOL 0.25-0.035
0.25-35 KIT ORAL DAILY
Qty: 3 | Refills: 3 | Status: ACTIVE | COMMUNITY
Start: 2023-05-08 | End: 1900-01-01

## 2023-05-08 NOTE — HISTORY OF PRESENT ILLNESS
[FreeTextEntry1] : 23Y/O  HERE FOR F-U AFTER VTOP 23  .;PT C/O CRAMPS ON AND OFF;-[facility].\par PMHX;/\par PSHX;/\par SOCIAL HX;-ETOH -CIGG \par STD;  CHLAMYDIA /        DISCUSSED CONDOMS\par FAMILY HISTORY OF BREAST CANCER\par REVIEW OF SYMPTOMS DONE;\par ALLERGIES; pt answered IBUPROFEN\par Medication reconciliation was completed by reviewing, with the patient's\par involvement, the patient's current outpatient medications and those \par ordered for the patient today. \par \par PE;ABDOMEN SOFT NT ND\par EXT -CCE\par \par A;P;CRAMPS AFTER VTOP\par -SONO REVIEWED\par -CONTRACEPTION DISCUSSED;SPRINTEC RBA DISCUSSED\par - F-U PRN.

## 2023-05-22 RX ORDER — TERCONAZOLE 8 MG/G
0.8 CREAM VAGINAL
Qty: 1 | Refills: 0 | Status: ACTIVE | COMMUNITY
Start: 2023-04-21 | End: 1900-01-01

## 2023-06-08 NOTE — OB PROVIDER TRIAGE NOTE - HISTORY OF PRESENT ILLNESS
Detail Level: Detailed 21yo  at 34w2d EDC 10/22/20 by LMP c/w second trimester sono presents to L&D with leakage of clear fluid @0800. Reports she work up and felt a gush as though she was urinating. Denies soaking pads. Denies contractions or VB. Good fetal movement. Denies fever, chills, CP, SOB, N/V, urinary symptoms or vaginal discharge. Last PO intake yesterday @9pm, last BM today, last intercourse several weeks ago. Reports only drinking 2 bottles of water daily. Chlamydia positive in 2020, treated, no KELLI. Anaphylactic to ibuprofen. GBS unknown. Detail Level: Simple

## 2023-07-06 ENCOUNTER — NON-APPOINTMENT (OUTPATIENT)
Age: 23
End: 2023-07-06

## 2023-07-10 ENCOUNTER — APPOINTMENT (OUTPATIENT)
Dept: OBGYN | Facility: CLINIC | Age: 23
End: 2023-07-10
Payer: MEDICAID

## 2023-07-10 PROCEDURE — 99213 OFFICE O/P EST LOW 20 MIN: CPT

## 2023-07-10 NOTE — HISTORY OF PRESENT ILLNESS
[FreeTextEntry1] : 21Y/O  HERE C/O MILD VAGINAL DC;-ITCHING OR BURNING;-[facility].\par PMHX;/\par PSHX;/\par SOCIAL HX;-ETOH -CIGG \par STD;  CHLAMYDIA /        DISCUSSED CONDOMS\par FAMILY HISTORY OF BREAST CANCER\par REVIEW OF SYMPTOMS DONE;\par ALLERGIES; pt answered IBUPROFEN\par Medication reconciliation was completed by reviewing, with the patient's\par involvement, the patient's current outpatient medications and those \par ordered for the patient today. \par \par PE;ABDOMEN SOFT NT ND\par NL GENITALIA\par VAGINA MIN WHITE DC\par CX-CMT\par UTERUS NL SZIE NT\par ADNEXA NT - MASSES\par EXT -CCE\par \par A;P;VAGINAL DC\par -CULTURES SENT\par -DISCUSSED IUD FOR CONTRACEPTION RBA DISCUSSED\par -F-U AFTER ABOVE

## 2023-07-13 NOTE — OB RN TRIAGE NOTE - NS_TRIAGETIMEOFMEDICALSCREENING_OBGYN_ALL_OB_DT
27-Oct-2020 07:40 Olanzapine Pregnancy And Lactation Text: This medication is pregnancy category C.   There are no adequate and well controlled trials with olanzapine in pregnant females.  Olanzapine should be used during pregnancy only if the potential benefit justifies the potential risk to the fetus.   In a study in lactating healthy women, olanzapine was excreted in breast milk.  It is recommended that women taking olanzapine should not breast feed.

## 2023-07-17 DIAGNOSIS — B37.9 CANDIDIASIS, UNSPECIFIED: ICD-10-CM

## 2023-07-17 LAB
A VAGINAE DNA VAG QL NAA+PROBE: NORMAL
BVAB2 DNA VAG QL NAA+PROBE: NORMAL
C KRUSEI DNA VAG QL NAA+PROBE: NEGATIVE
C KRUSEI DNA VAG QL NAA+PROBE: POSITIVE
C TRACH RRNA SPEC QL NAA+PROBE: NEGATIVE
CANDIDA DNA VAG QL NAA+PROBE: NEGATIVE
MEGA1 DNA VAG QL NAA+PROBE: NORMAL
N GONORRHOEA RRNA SPEC QL NAA+PROBE: NEGATIVE
T VAGINALIS RRNA SPEC QL NAA+PROBE: NEGATIVE
VZV AB TITR SER: POSITIVE
VZV IGG SER IF-ACNC: 788.9 INDEX

## 2023-07-17 RX ORDER — FLUCONAZOLE 150 MG/1
150 TABLET ORAL
Qty: 1 | Refills: 1 | Status: ACTIVE | COMMUNITY
Start: 2023-07-17 | End: 1900-01-01

## 2023-07-18 LAB — MUV IGM SER QL IA: <0.8 AU

## 2023-08-07 ENCOUNTER — APPOINTMENT (OUTPATIENT)
Dept: OBGYN | Facility: CLINIC | Age: 23
End: 2023-08-07

## 2023-10-11 ENCOUNTER — EMERGENCY (EMERGENCY)
Facility: HOSPITAL | Age: 23
LOS: 0 days | Discharge: HOME | End: 2023-10-11
Attending: EMERGENCY MEDICINE
Payer: MEDICAID

## 2023-10-11 VITALS
TEMPERATURE: 99 F | WEIGHT: 223.99 LBS | OXYGEN SATURATION: 100 % | RESPIRATION RATE: 18 BRPM | SYSTOLIC BLOOD PRESSURE: 134 MMHG | HEART RATE: 72 BPM | DIASTOLIC BLOOD PRESSURE: 67 MMHG

## 2023-10-11 DIAGNOSIS — Z88.6 ALLERGY STATUS TO ANALGESIC AGENT: ICD-10-CM

## 2023-10-11 DIAGNOSIS — J00 ACUTE NASOPHARYNGITIS [COMMON COLD]: ICD-10-CM

## 2023-10-11 DIAGNOSIS — R05.9 COUGH, UNSPECIFIED: ICD-10-CM

## 2023-10-11 DIAGNOSIS — R05.8 OTHER SPECIFIED COUGH: ICD-10-CM

## 2023-10-11 PROCEDURE — 71046 X-RAY EXAM CHEST 2 VIEWS: CPT | Mod: 26

## 2023-10-11 PROCEDURE — 99283 EMERGENCY DEPT VISIT LOW MDM: CPT | Mod: 25

## 2023-10-11 PROCEDURE — 94640 AIRWAY INHALATION TREATMENT: CPT

## 2023-10-11 PROCEDURE — 71046 X-RAY EXAM CHEST 2 VIEWS: CPT

## 2023-10-11 PROCEDURE — 99284 EMERGENCY DEPT VISIT MOD MDM: CPT

## 2023-10-11 RX ORDER — ALBUTEROL 90 UG/1
1 AEROSOL, METERED ORAL ONCE
Refills: 0 | Status: COMPLETED | OUTPATIENT
Start: 2023-10-11 | End: 2023-10-11

## 2023-10-11 RX ORDER — FLUTICASONE PROPIONATE 50 MCG
1 SPRAY, SUSPENSION NASAL ONCE
Refills: 0 | Status: COMPLETED | OUTPATIENT
Start: 2023-10-11 | End: 2023-10-11

## 2023-10-11 RX ADMIN — ALBUTEROL 1 PUFF(S): 90 AEROSOL, METERED ORAL at 20:48

## 2023-10-11 RX ADMIN — Medication 1 SPRAY(S): at 20:25

## 2023-10-11 NOTE — ED PROVIDER NOTE - NSFOLLOWUPINSTRUCTIONS_ED_ALL_ED_FT
Upper Respiratory Infection, Adult  An upper respiratory infection (URI) is a common viral infection of the nose, throat, and upper air passages that lead to the lungs. The most common type of URI is the common cold. URIs usually get better on their own, without medical treatment.    What are the causes?  A URI is caused by a virus. You may catch a virus by:    Breathing in droplets from an infected person's cough or sneeze.  Touching something that has been exposed to the virus (contaminated) and then touching your mouth, nose, or eyes.    What increases the risk?  You are more likely to get a URI if:    You are very young or very old.  It is yrn or winter.  You have close contact with others, such as at a , school, or health care facility.  You smoke.  You have long-term (chronic) heart or lung disease.  You have a weakened disease-fighting (immune) system.  You have nasal allergies or asthma.  You are experiencing a lot of stress.  You work in an area that has poor air circulation.  You have poor nutrition.    What are the signs or symptoms?  A URI usually involves some of the following symptoms:    Runny or stuffy (congested) nose.  Sneezing.  Cough.  Sore throat.  Headache.  Fatigue.  Fever.  Loss of appetite.  Pain in your forehead, behind your eyes, and over your cheekbones (sinus pain).  Muscle aches.  Redness or irritation of the eyes.  Pressure in the ears or face.    How is this diagnosed?  This condition may be diagnosed based on your medical history and symptoms, and a physical exam. Your health care provider may use a cotton swab to take a mucus sample from your nose (nasal swab). This sample can be tested to determine what virus is causing the illness.    How is this treated?  URIs usually get better on their own within 7–10 days. You can take steps at home to relieve your symptoms. Medicines cannot cure URIs, but your health care provider may recommend certain medicines to help relieve symptoms, such as:    Over-the-counter cold medicines.  Cough suppressants. Coughing is a type of defense against infection that helps to clear the respiratory system, so take these medicines only as recommended by your health care provider.  Fever-reducing medicines.    Follow these instructions at home:  Activity     Rest as needed.  If you have a fever, stay home from work or school until your fever is gone or until your health care provider says you are no longer contagious. Your health care provider may have you wear a face mask to prevent your infection from spreading.  Relieving symptoms     Gargle with a salt-water mixture 3–4 times a day or as needed. To make a salt-water mixture, completely dissolve ½–1 tsp of salt in 1 cup of warm water.  Use a cool-mist humidifier to add moisture to the air. This can help you breathe more easily.  Eating and drinking     Drink enough fluid to keep your urine pale yellow.  ImageEat soups and other clear broths.  General instructions     Take over-the-counter and prescription medicines only as told by your health care provider. These include cold medicines, fever reducers, and cough suppressants.  Do not use any products that contain nicotine or tobacco, such as cigarettes and e-cigarettes. If you need help quitting, ask your health care provider.   Stay away from secondhand smoke.  Stay up to date on all immunizations, including the yearly (annual) flu vaccine.  ImageKeep all follow-up visits as told by your health care provider. This is important.  How to prevent the spread of infection to others     ImageURIs can be passed from person to person (are contagious). To prevent the infection from spreading:    Wash your hands often with soap and water. If soap and water are not available, use hand .  Avoid touching your mouth, face, eyes, or nose.  Cough or sneeze into a tissue or your sleeve or elbow instead of into your hand or into the air.    Contact a health care provider if:  You are getting worse instead of better.  You have a fever or chills.  Your mucus is brown or red.  You have yellow or brown discharge coming from your nose.  You have pain in your face, especially when you bend forward.  You have swollen neck glands.  You have pain while swallowing.  You have white areas in the back of your throat.  Get help right away if:  You have shortness of breath that gets worse.  You have severe or persistent:    Headache.  Ear pain.  Sinus pain.  Chest pain.    You have chronic lung disease along with any of the following:    Wheezing.  Prolonged cough.  Coughing up blood.  A change in your usual mucus.    You have a stiff neck.  You have changes in your:    Vision.  Hearing.  Thinking.  Mood.    Summary  An upper respiratory infection (URI) is a common infection of the nose, throat, and upper air passages that lead to the lungs.  A URI is caused by a virus.  URIs usually get better on their own within 7–10 days.  Medicines cannot cure URIs, but your health care provider may recommend certain medicines to help relieve symptoms.  This information is not intended to replace advice given to you by your health care provider. Make sure you discuss any questions you have with your health care provider.

## 2023-10-11 NOTE — ED PROVIDER NOTE - CLINICAL SUMMARY MEDICAL DECISION MAKING FREE TEXT BOX
Patient presents with URI symptoms. + cough productive of green sputum. cxr negative for pneumonia. Symptomatic medications given. Discharged with pmd follow up and return precautions.

## 2023-10-11 NOTE — ED PROVIDER NOTE - OBJECTIVE STATEMENT
pt presents to ED c/o cold sxs since yesterday. kids with same sxs, given cough meds by peds. Denies fever/chill/HA/dizziness/chest pain/palpitation/sob/abd pain/n/v/d/ black stool/bloody stool/urinary sxs

## 2023-10-11 NOTE — ED PROVIDER NOTE - PHYSICAL EXAMINATION
CONSTITUTIONAL: Well-appearing; well-nourished; in no apparent distress.   ENT: + rhinorrhea; normal pharynx with no tonsillar hypertrophy.   NECK: Supple; non-tender; no cervical lymphadenopathy. No JVD.   CARDIOVASCULAR: Normal S1, S2; no murmurs, rubs, or gallops.   RESPIRATORY: mild coarse BS L side; Normal chest excursion with respiration  GI/: Normal bowel sounds; non-distended; non-tender; no palpable organomegaly.   MS: No evidence of trauma or deformity. Normal ROM in all four extremities; non-tender to palpation; distal pulses are normal.   SKIN: Normal for age and race; warm; dry; good turgor; no apparent lesions or exudate.   NEURO/PSYCH: A & O x 4; grossly unremarkable. mood and manner are appropriate.

## 2023-10-11 NOTE — ED PROVIDER NOTE - ATTENDING APP SHARED VISIT CONTRIBUTION OF CARE
23-year-old female no past medical history presents with viral symptoms.  Patient states for the last 1 to 2 days she has been having nasal congestion runny nose and cough.  Cough is productive of green sputum.  No fevers.  Positive chills.  He is at home sick with similar symptoms.  No nausea vomiting diarrhea.  No chest pain shortness of breath palpitations.    CONSTITUTIONAL: Well-developed; well-nourished; in no acute distress.   SKIN: warm, dry  HEAD: Normocephalic; atraumatic.  EYES: PERRL, EOMI, no conjunctival erythema  ENT: No nasal discharge; airway clear.  NECK: Supple; non tender.  CARD: S1, S2 normal;  Regular rate and rhythm.   RESP: No wheezes, rales or rhonchi.  ABD: soft non tender, non distended, no rebound or guarding  EXT: Normal ROM.  5/5 strength in all 4 extremities   LYMPH: No acute cervical adenopathy.  NEURO: Alert, oriented, grossly unremarkable. neurovascularly intact  PSYCH: Cooperative, appropriate.

## 2023-10-11 NOTE — ED PROVIDER NOTE - NS ED ATTENDING STATEMENT MOD
This was a shared visit with the CHARLINE. I reviewed and verified the documentation and independently performed the documented:

## 2023-10-11 NOTE — ED ADULT NURSE NOTE - OBJECTIVE STATEMENT
Pt. reports to ED with cold symptoms x 1 day. Pt. reports headache and cough. Pt reports chest pain and back pain.

## 2023-10-11 NOTE — ED PROVIDER NOTE - PATIENT PORTAL LINK FT
You can access the FollowMyHealth Patient Portal offered by [facility] by registering at the following website: [email or web address]. By joining [employer]’s FollowMyHealth portal, you will also be able to view your health information using other applications (apps) compatible with our system.

## 2023-10-19 ENCOUNTER — APPOINTMENT (OUTPATIENT)
Dept: OBGYN | Facility: CLINIC | Age: 23
End: 2023-10-19

## 2023-11-08 NOTE — OB PROVIDER H&P - HBSAG: DATE, OB PROFILE
Pt has become frustrated and agitated. Pt was given distractions such as coloring, television, and conversation, moved closer to nurses station but all unsuccessful. 19-May-2020

## 2023-11-10 NOTE — ED ADULT NURSE NOTE - NSICDXNOFAMILYHX_GEN_ALL_CORE
<-- Click to add NO pertinent Family History H/o epistaxis as a younger child. Denies cauterization or excessive bleeding.

## 2024-01-04 ENCOUNTER — APPOINTMENT (OUTPATIENT)
Dept: OBGYN | Facility: CLINIC | Age: 24
End: 2024-01-04
Payer: MEDICAID

## 2024-01-04 DIAGNOSIS — R10.2 PELVIC AND PERINEAL PAIN: ICD-10-CM

## 2024-01-04 PROCEDURE — 99213 OFFICE O/P EST LOW 20 MIN: CPT

## 2024-01-04 PROCEDURE — 76830 TRANSVAGINAL US NON-OB: CPT

## 2024-01-04 RX ORDER — ACETAMINOPHEN 500 MG/1
500 TABLET ORAL
Qty: 30 | Refills: 0 | Status: ACTIVE | COMMUNITY
Start: 2024-01-04 | End: 1900-01-01

## 2024-01-04 NOTE — HISTORY OF PRESENT ILLNESS
[FreeTextEntry1] : ---24 Y/O  HERE C/O PELVIC PAIN X 1 WEEK;DENIES NVFC. PMHX;/ PSHX;/ SOCIAL HX;-ETOH -CIGG  STD;  CHLAMYDIA /        DISCUSSED CONDOMS FAMILY HISTORY OF BREAST CANCER REVIEW OF SYMPTOMS DONE; ALLERGIES; pt answered IBUPROFEN Medication reconciliation was completed by reviewing, with the patient's involvement, the patient's current outpatient medications and those  ordered for the patient today.   PE;ABDOMEN SOFT NT ND NL GENITALIA VAGINA MIN WHITE DC CX-CMT UTERUS NL SZIE MILD TENDER ADNEXA NT - MASSES EXT -CCE  A;P; PELVIC PAIN,VAGINAL DC -CULTURES SENT -SONO -TYLENOL PRN -F-U AFTYER ABOVE.

## 2024-01-08 LAB
BV BACTERIA RRNA VAG QL NAA+PROBE: NOT DETECTED
C GLABRATA RNA VAG QL NAA+PROBE: NOT DETECTED
CANDIDA RRNA VAG QL PROBE: NOT DETECTED
T VAGINALIS RRNA SPEC QL NAA+PROBE: NOT DETECTED

## 2024-01-18 ENCOUNTER — APPOINTMENT (OUTPATIENT)
Dept: OBGYN | Facility: CLINIC | Age: 24
End: 2024-01-18

## 2024-02-18 ENCOUNTER — EMERGENCY (EMERGENCY)
Facility: HOSPITAL | Age: 24
LOS: 0 days | Discharge: HOME | End: 2024-02-19
Attending: STUDENT IN AN ORGANIZED HEALTH CARE EDUCATION/TRAINING PROGRAM
Payer: MEDICAID

## 2024-02-18 VITALS — RESPIRATION RATE: 18 BRPM | OXYGEN SATURATION: 100 % | HEART RATE: 78 BPM

## 2024-02-18 VITALS
DIASTOLIC BLOOD PRESSURE: 64 MMHG | TEMPERATURE: 98 F | SYSTOLIC BLOOD PRESSURE: 120 MMHG | HEART RATE: 78 BPM | OXYGEN SATURATION: 99 % | RESPIRATION RATE: 18 BRPM | WEIGHT: 160.06 LBS

## 2024-02-18 DIAGNOSIS — R09.A2 FOREIGN BODY SENSATION, THROAT: ICD-10-CM

## 2024-02-18 DIAGNOSIS — Z88.6 ALLERGY STATUS TO ANALGESIC AGENT: ICD-10-CM

## 2024-02-18 LAB
ALBUMIN SERPL ELPH-MCNC: 4.5 G/DL — SIGNIFICANT CHANGE UP (ref 3.5–5.2)
ALP SERPL-CCNC: 57 U/L — SIGNIFICANT CHANGE UP (ref 30–115)
ALT FLD-CCNC: 10 U/L — SIGNIFICANT CHANGE UP (ref 0–41)
ANION GAP SERPL CALC-SCNC: 10 MMOL/L — SIGNIFICANT CHANGE UP (ref 7–14)
AST SERPL-CCNC: 16 U/L — SIGNIFICANT CHANGE UP (ref 0–41)
BASOPHILS # BLD AUTO: 0.05 K/UL — SIGNIFICANT CHANGE UP (ref 0–0.2)
BASOPHILS NFR BLD AUTO: 0.7 % — SIGNIFICANT CHANGE UP (ref 0–1)
BILIRUB SERPL-MCNC: <0.2 MG/DL — SIGNIFICANT CHANGE UP (ref 0.2–1.2)
BUN SERPL-MCNC: 12 MG/DL — SIGNIFICANT CHANGE UP (ref 10–20)
CALCIUM SERPL-MCNC: 9.2 MG/DL — SIGNIFICANT CHANGE UP (ref 8.4–10.5)
CHLORIDE SERPL-SCNC: 105 MMOL/L — SIGNIFICANT CHANGE UP (ref 98–110)
CO2 SERPL-SCNC: 23 MMOL/L — SIGNIFICANT CHANGE UP (ref 17–32)
CREAT SERPL-MCNC: 0.8 MG/DL — SIGNIFICANT CHANGE UP (ref 0.7–1.5)
EGFR: 107 ML/MIN/1.73M2 — SIGNIFICANT CHANGE UP
EOSINOPHIL # BLD AUTO: 0.69 K/UL — SIGNIFICANT CHANGE UP (ref 0–0.7)
EOSINOPHIL NFR BLD AUTO: 10.1 % — HIGH (ref 0–8)
GLUCOSE SERPL-MCNC: 76 MG/DL — SIGNIFICANT CHANGE UP (ref 70–99)
HCT VFR BLD CALC: 33.7 % — LOW (ref 37–47)
HGB BLD-MCNC: 10.3 G/DL — LOW (ref 12–16)
IMM GRANULOCYTES NFR BLD AUTO: 0.3 % — SIGNIFICANT CHANGE UP (ref 0.1–0.3)
LYMPHOCYTES # BLD AUTO: 2.6 K/UL — SIGNIFICANT CHANGE UP (ref 1.2–3.4)
LYMPHOCYTES # BLD AUTO: 38.1 % — SIGNIFICANT CHANGE UP (ref 20.5–51.1)
MCHC RBC-ENTMCNC: 24.6 PG — LOW (ref 27–31)
MCHC RBC-ENTMCNC: 30.6 G/DL — LOW (ref 32–37)
MCV RBC AUTO: 80.4 FL — LOW (ref 81–99)
MONOCYTES # BLD AUTO: 0.51 K/UL — SIGNIFICANT CHANGE UP (ref 0.1–0.6)
MONOCYTES NFR BLD AUTO: 7.5 % — SIGNIFICANT CHANGE UP (ref 1.7–9.3)
NEUTROPHILS # BLD AUTO: 2.96 K/UL — SIGNIFICANT CHANGE UP (ref 1.4–6.5)
NEUTROPHILS NFR BLD AUTO: 43.3 % — SIGNIFICANT CHANGE UP (ref 42.2–75.2)
NRBC # BLD: 0 /100 WBCS — SIGNIFICANT CHANGE UP (ref 0–0)
PLATELET # BLD AUTO: 360 K/UL — SIGNIFICANT CHANGE UP (ref 130–400)
PMV BLD: 9.6 FL — SIGNIFICANT CHANGE UP (ref 7.4–10.4)
POTASSIUM SERPL-MCNC: 4.1 MMOL/L — SIGNIFICANT CHANGE UP (ref 3.5–5)
POTASSIUM SERPL-SCNC: 4.1 MMOL/L — SIGNIFICANT CHANGE UP (ref 3.5–5)
PROT SERPL-MCNC: 7.5 G/DL — SIGNIFICANT CHANGE UP (ref 6–8)
RBC # BLD: 4.19 M/UL — LOW (ref 4.2–5.4)
RBC # FLD: 16.4 % — HIGH (ref 11.5–14.5)
SODIUM SERPL-SCNC: 138 MMOL/L — SIGNIFICANT CHANGE UP (ref 135–146)
WBC # BLD: 6.83 K/UL — SIGNIFICANT CHANGE UP (ref 4.8–10.8)
WBC # FLD AUTO: 6.83 K/UL — SIGNIFICANT CHANGE UP (ref 4.8–10.8)

## 2024-02-18 PROCEDURE — 70491 CT SOFT TISSUE NECK W/DYE: CPT | Mod: MA

## 2024-02-18 PROCEDURE — 99284 EMERGENCY DEPT VISIT MOD MDM: CPT | Mod: 25

## 2024-02-18 PROCEDURE — 70360 X-RAY EXAM OF NECK: CPT

## 2024-02-18 PROCEDURE — 84703 CHORIONIC GONADOTROPIN ASSAY: CPT

## 2024-02-18 PROCEDURE — 99285 EMERGENCY DEPT VISIT HI MDM: CPT

## 2024-02-18 PROCEDURE — 80053 COMPREHEN METABOLIC PANEL: CPT

## 2024-02-18 PROCEDURE — 85025 COMPLETE CBC W/AUTO DIFF WBC: CPT

## 2024-02-18 PROCEDURE — 94640 AIRWAY INHALATION TREATMENT: CPT

## 2024-02-18 PROCEDURE — 36415 COLL VENOUS BLD VENIPUNCTURE: CPT

## 2024-02-18 RX ORDER — LIDOCAINE HCL 20 MG/ML
4 VIAL (ML) INJECTION ONCE
Refills: 0 | Status: DISCONTINUED | OUTPATIENT
Start: 2024-02-18 | End: 2024-02-18

## 2024-02-18 RX ORDER — LIDOCAINE HCL 20 MG/ML
5 VIAL (ML) INJECTION ONCE
Refills: 0 | Status: COMPLETED | OUTPATIENT
Start: 2024-02-18 | End: 2024-02-18

## 2024-02-18 NOTE — ED PROVIDER NOTE - NSPTACCESSSVCSAPPTDETAILS_ED_ALL_ED_FT
S: No new issues/events overnight, attempted CPAP  this morning, but became slightly more tachypneic, comfortable on CPAP 10/5.   awake alert, no resp distress    O: ICU Vital Signs Last 24 Hrs  T(F): 99.6 (18 @ 09:35), Max: 100.9 (18 @ 17:00)  HR: 76 (18 @ 12:00) (70 - 90)  BP: 122/57 (18 @ 12:00) (103/53 - 135/63)  BP(mean): 78 (18 @ 12:00) (65 - 86)  ABP: 104/50 (18 @ 19:00)  RR: 24 (18 @ 12:00) (14 - 25)  SpO2: 99% (18 @ 12:00) (96% - 100%)    PHYSICAL EXAM:     Neurological: awake alert, moves all extremety(ies),   Cardiovascular: RRR  Respiratory: CTA  Gastrointestinal: soft, midline incision with dsg c/d/i, GUCCI right sided x 2 serous drainage. G-tube and retrograde D-tube to LIWS, J-tube getting TF@20/hr.   Extremities: warm, has dependent edema.   Vascular: right 3rd, 4th toes cyanosis for past wk. not worse. otherwise BLE warm with palp DP.     LABS:        153<H>  |  114<H>  |  45<H>  ----------------------------<  194<H>  3.2<L>   |  31  |  1.28    Ca    7.5<L>      2018 05:50  Phos  2.5       Mg     2.8         TPro  4.9<L>  /  Alb  2.0<L>  /  TBili  9.0<H>  /  DBili  7.1<H>  /  AST  67<H>  /  ALT  82<H>  /  AlkPhos  81    LIVER FUNCTIONS - ( 2018 06:42 )  Alb: 2.0 g/dL / Pro: 4.9 g/dL / ALK PHOS: 81 U/L / ALT: 82 U/L / AST: 67 U/L / GGT: x                               9.4    13.2  )-----------( 56       ( 2018 05:50 )             29.9   PT/INR - ( 2018 05:50 )   PT: 15.6 sec;   INR: 1.40          PTT - ( 2018 05:50 )  PTT:73.5 sec  Urinalysis Basic - ( 2018 09:59 )    Color: Yellow / Appearance: Clear / S.020 / pH: x  Gluc: x / Ketone: NEGATIVE  / Bili: Moderate / Urobili: 0.2 E.U./dL   Blood: x / Protein: Trace mg/dL / Nitrite: NEGATIVE   Leuk Esterase: NEGATIVE / RBC: 5-10 /HPF / WBC < 5 /HPF   Sq Epi: x / Non Sq Epi: 5-10 /HPF / Bacteria: Many /HPF    CAPILLARY BLOOD GLUCOSE      POCT Blood Glucose.: 200 mg/dL (2018 10:25)  POCT Blood Glucose.: 182 mg/dL (2018 06:18)  POCT Blood Glucose.: 146 mg/dL (2018 22:01)  POCT Blood Glucose.: 150 mg/dL (2018 16:04)    MEDICATIONS  (STANDING):  albumin human 25% IVPB 50 milliLiter(s) IV Intermittent every 8 hours  ALBUTerol/ipratropium for Nebulization 3 milliLiter(s) Nebulizer every 6 hours  chlorhexidine 0.12% Liquid 15 milliLiter(s) Swish and Spit two times a day  digoxin  Injectable 0.125 milliGRAM(s) IV Push daily  heparin  Infusion 800 Unit(s)/Hr (8 mL/Hr) IV Continuous <Continuous>  insulin lispro (HumaLOG) corrective regimen sliding scale   SubCutaneous every 6 hours  metolazone 10 milliGRAM(s) Oral two times a day  micafungin IVPB 100 milliGRAM(s) IV Intermittent every 24 hours  micafungin IVPB      pantoprazole  Injectable 40 milliGRAM(s) IV Push every 12 hours  piperacillin/tazobactam IVPB. 3.375 Gram(s) IV Intermittent every 6 hours  potassium chloride   Powder 40 milliEquivalent(s) Enteral Tube every 4 hours    MEDICATIONS  (PRN):  acetaminophen    Suspension 650 milliGRAM(s) Enteral Tube every 6 hours PRN For Temp greater than 38 C (100.4 F)  HYDROmorphone  Injectable 0.5 milliGRAM(s) IV Push every 3 hours PRN Severe Pain (7 - 10)      Oliveira:	  [ ] None	[x ] Daily Oliveira Order Placed	   Indication:	  [ x] Strict I and O's    [ ] Obstruction     [ ] Incontinence + Stage 3 or 4 Decubitus  Central Line:  [ x] None	   [ ]  Medication / TPN Administration     [ ] No Peripheral IV S: No new issues/events overnight, attempted CPAP  this morning, but became slightly more tachypneic, comfortable on CPAP 10/5.   awake alert, no resp distress    O: ICU Vital Signs Last 24 Hrs  T(F): 99.6 (18 @ 09:35), Max: 100.9 (18 @ 17:00)  HR: 76 (18 @ 12:00) (70 - 90)  BP: 122/57 (18 @ 12:00) (103/53 - 135/63)  BP(mean): 78 (18 @ 12:00) (65 - 86)  ABP: 104/50 (18 @ 19:00)  RR: 24 (18 @ 12:00) (14 - 25)  SpO2: 99% (18 @ 12:00) (96% - 100%)    PHYSICAL EXAM:     Neurological: awake alert, moves all extremety(ies),   Cardiovascular: RRR  Respiratory: CTA  Gastrointestinal: soft, midline incision with dsg c/d/i, GUCCI right sided x 2 serous drainage. G-tube and retrograde D-tube to LIWS, J-tube getting TF@20/hr.   Extremities: warm, has dependent edema.   Vascular: right 3rd, 4th toes cyanotic for past wk. not getting worse. otherwise BLE warm with palp DP. various blisters on toes b/l     LABS:        153<H>  |  114<H>  |  45<H>  ----------------------------<  194<H>  3.2<L>   |  31  |  1.28    Ca    7.5<L>      2018 05:50  Phos  2.5       Mg     2.8         TPro  4.9<L>  /  Alb  2.0<L>  /  TBili  9.0<H>  /  DBili  7.1<H>  /  AST  67<H>  /  ALT  82<H>  /  AlkPhos  81    LIVER FUNCTIONS - ( 2018 06:42 )  Alb: 2.0 g/dL / Pro: 4.9 g/dL / ALK PHOS: 81 U/L / ALT: 82 U/L / AST: 67 U/L / GGT: x                               9.4    13.2  )-----------( 56       ( 2018 05:50 )             29.9   PT/INR - ( 2018 05:50 )   PT: 15.6 sec;   INR: 1.40          PTT - ( 2018 05:50 )  PTT:73.5 sec  Urinalysis Basic - ( 2018 09:59 )    Color: Yellow / Appearance: Clear / S.020 / pH: x  Gluc: x / Ketone: NEGATIVE  / Bili: Moderate / Urobili: 0.2 E.U./dL   Blood: x / Protein: Trace mg/dL / Nitrite: NEGATIVE   Leuk Esterase: NEGATIVE / RBC: 5-10 /HPF / WBC < 5 /HPF   Sq Epi: x / Non Sq Epi: 5-10 /HPF / Bacteria: Many /HPF    CAPILLARY BLOOD GLUCOSE      POCT Blood Glucose.: 200 mg/dL (2018 10:25)  POCT Blood Glucose.: 182 mg/dL (2018 06:18)  POCT Blood Glucose.: 146 mg/dL (2018 22:01)  POCT Blood Glucose.: 150 mg/dL (2018 16:04)    MEDICATIONS  (STANDING):  albumin human 25% IVPB 50 milliLiter(s) IV Intermittent every 8 hours  ALBUTerol/ipratropium for Nebulization 3 milliLiter(s) Nebulizer every 6 hours  chlorhexidine 0.12% Liquid 15 milliLiter(s) Swish and Spit two times a day  digoxin  Injectable 0.125 milliGRAM(s) IV Push daily  heparin  Infusion 800 Unit(s)/Hr (8 mL/Hr) IV Continuous <Continuous>  insulin lispro (HumaLOG) corrective regimen sliding scale   SubCutaneous every 6 hours  metolazone 10 milliGRAM(s) Oral two times a day  micafungin IVPB 100 milliGRAM(s) IV Intermittent every 24 hours  micafungin IVPB      pantoprazole  Injectable 40 milliGRAM(s) IV Push every 12 hours  piperacillin/tazobactam IVPB. 3.375 Gram(s) IV Intermittent every 6 hours  potassium chloride   Powder 40 milliEquivalent(s) Enteral Tube every 4 hours    MEDICATIONS  (PRN):  acetaminophen    Suspension 650 milliGRAM(s) Enteral Tube every 6 hours PRN For Temp greater than 38 C (100.4 F)  HYDROmorphone  Injectable 0.5 milliGRAM(s) IV Push every 3 hours PRN Severe Pain (7 - 10)      Oliveira:	  [ ] None	[x ] Daily Oliveira Order Placed	   Indication:	  [ x] Strict I and O's    [ ] Obstruction     [ ] Incontinence + Stage 3 or 4 Decubitus  Central Line:  [ x] None	   [ ]  Medication / TPN Administration     [ ] No Peripheral IV globus sensation

## 2024-02-18 NOTE — ED ADULT TRIAGE NOTE - CHIEF COMPLAINT QUOTE
Pt presents to the ED c/o of swallowing a fish bone. Pt states she feels something stuck in her throat. Pt able to speak full sentences. No signs of respiratory distress.

## 2024-02-18 NOTE — CONSULT NOTE ADULT - ASSESSMENT
Foreign Body sensation in throat (fishbone)  not seen on direct visualization Foreign Body sensation in throat (fishbone)  not seen on direct visualization  Obtain CTscan Soft tissue Neck    ADDENDUM:  CTscan of soft tissue neck performed and according to Radiology there is no sign of foreign body

## 2024-02-18 NOTE — ED PROVIDER NOTE - CLINICAL SUMMARY MEDICAL DECISION MAKING FREE TEXT BOX
23 year old female presents to the ed c/o possible fish bone stuck in her throat approximately 1 hour prior to arrival. No sob no drooling, tolerating secretions.  vs reviewed labs obtained and reviewed, ent consulted scoped by ent myself bedside no foreign body seen however small abrasion noted to lateral epiglottis. CT obtained which demonstrated < from: CT Neck Soft Tissue w/ IV Cont (02.19.24 @ 01:47) >IMPRESSION:No CT evidence for radiopaque foreign body. Patient a spoken to in detail about results  All questions addressed.  Results of ED work up discussed and patient given a copy of the results. Recommended ENT follow up. Return precautions given.

## 2024-02-18 NOTE — ED PROVIDER NOTE - PHYSICAL EXAMINATION
VITAL SIGNS: I have reviewed nursing notes and confirm.  CONSTITUTIONAL: Well-developed; well-nourished; in no acute distress.  ENT: MMM, OP clear. No foreign body visualized. No erythema/exudates/tonsillar hypertrophy. Uvula is midline. No pooling of secretions. Normal voice.   NECK: Supple; non tender.  CARD: S1, S2 normal; no murmurs, gallops, or rubs. Regular rate and rhythm.  RESP: Normal respiratory effort, no tachypnea or distress. Lungs CTAB, no wheezes, rales or rhonchi.  NEURO: Alert, oriented. Grossly unremarkable. No focal deficits.  PSYCH: Cooperative, appropriate.

## 2024-02-18 NOTE — ED PROVIDER NOTE - OBJECTIVE STATEMENT
23F no PMH presents with globus sensation after eating fish just prior to arrival. Feels that a fish bone is stuck in her throat. Notes some pain with swallowing but denies difficulty swallowing. Denies difficulty breathing, tolerating PO. No other trauma or complaints.

## 2024-02-18 NOTE — ED PROVIDER NOTE - ATTENDING CONTRIBUTION TO CARE
23 year old female presents to the ed c/o possible fish bone stuck in her throat approximately 1 hour prior to arrival. No sob no drooling, tolerating secretions.   CONSTITUTIONAL: WA / WN / NAD  HEAD: NCAT  EYES: PERRL; EOMI;   ENT: Normal pharynx; mucous membranes pink/moist, no erythema. Airway patent no foreign body seen.  NECK: Supple;  CARD: RRR; nl S1/S2; no M/R/G.   RESP: Respiratory rate and effort are normal; breath sounds clear and equal bilaterally.  MSK/EXT: No gross deformities; full range of motion.  SKIN: Warm and dry;   NEURO: AAOx3  PSYCH: Memory Intact, Normal Affect

## 2024-02-18 NOTE — ED PROVIDER NOTE - PATIENT PORTAL LINK FT
You can access the FollowMyHealth Patient Portal offered by [facility] by registering at the following website: [email or web address]. By joining [email or web address] [email or web address] portal, you will also be able to view your health information using other applications (apps) compatible with our system.

## 2024-02-18 NOTE — CONSULT NOTE ADULT - PROBLEM SELECTOR RECOMMENDATION 9
Obtain CTscan soft tissue of neck If symptoms persist patient should follow up with ENT as outpatient

## 2024-02-18 NOTE — CONSULT NOTE ADULT - SUBJECTIVE AND OBJECTIVE BOX
23F no PMH presents with globus sensation after eating fish just prior to arrival. Feels that a fish bone is stuck in her throat. Notes some pain with swallowing but denies difficulty swallowing. Denies difficulty breathing, tolerating PO. No other trauma or complaints.      PAST MEDICAL & SURGICAL HISTORY:  No pertinent past medical history      No significant past surgical history        Allergies    ibuprofen (Anaphylaxis)    Intolerances      MEDICATIONS  (STANDING):  lidocaine 4% Injection for Nebulization 5 milliLiter(s) Nebulizer once      ROS: ENT, GI, , CV, Pulm, Neuro, Psych, MS, Heme, Endo, Constitional; all negative except as noted in HPI    Vital Signs Last 24 Hrs  T(C): 36.8 (18 Feb 2024 22:22), Max: 36.8 (18 Feb 2024 22:22)  T(F): 98.2 (18 Feb 2024 22:22), Max: 98.2 (18 Feb 2024 22:22)  HR: 78 (18 Feb 2024 22:38) (78 - 78)  BP: 120/64 (18 Feb 2024 22:22) (120/64 - 120/64)  BP(mean): --  RR: 18 (18 Feb 2024 22:38) (18 - 18)  SpO2: 100% (18 Feb 2024 22:38) (99% - 100%)    Parameters below as of 18 Feb 2024 22:38  Patient On (Oxygen Delivery Method): room air                              10.3   6.83  )-----------( 360      ( 18 Feb 2024 23:00 )             33.7    02-18    138  |  105  |  12  ----------------------------<  76  4.1   |  23  |  0.8    Ca    9.2      18 Feb 2024 23:00    TPro  7.5  /  Alb  4.5  /  TBili  <0.2  /  DBili  x   /  AST  16  /  ALT  10  /  AlkPhos  57  02-18       PHYSICAL EXAM:  Gen: NAD, well-developed  Head: Normocephalic, Atraumatic  Face: no edema/erythema/fluctuance, parotid glands soft without mass  Eyes: PERRL, EOMI, no scleral injection  Ears: Right - ear canal clear, TM intact without effusion            Left - ear canal clear, TM intact without effusion  Nose: Nares bilaterally patent, no discharge  Mouth: Mucosa moist, tongue/uvula midline, oropharynx clear. Performed FFL thru right nare, unable to visualize any foreign body.  The epiglottis is crisp and VC are mobile.  Further evaluation with glide scope unable to visualize FB  Neck: Flat, supple, no lymphadenopathy, trachea midline, no masses  Resp: breathing easily, no stridor  CV: no peripheral edema/cyanosis   23F no PMH presents with globus sensation after eating fish just prior to arrival. Feels that a fish bone is stuck in her throat. Notes some pain with swallowing but denies difficulty swallowing. Denies difficulty breathing, tolerating PO. No other trauma or complaints.      PAST MEDICAL & SURGICAL HISTORY:  No pertinent past medical history      No significant past surgical history        Allergies    ibuprofen (Anaphylaxis)    Intolerances      MEDICATIONS  (STANDING):  lidocaine 4% Injection for Nebulization 5 milliLiter(s) Nebulizer once      ROS: ENT, GI, , CV, Pulm, Neuro, Psych, MS, Heme, Endo, Constitutional; all negative except as noted in HPI    Vital Signs Last 24 Hrs  T(C): 36.8 (18 Feb 2024 22:22), Max: 36.8 (18 Feb 2024 22:22)  T(F): 98.2 (18 Feb 2024 22:22), Max: 98.2 (18 Feb 2024 22:22)  HR: 78 (18 Feb 2024 22:38) (78 - 78)  BP: 120/64 (18 Feb 2024 22:22) (120/64 - 120/64)  BP(mean): --  RR: 18 (18 Feb 2024 22:38) (18 - 18)  SpO2: 100% (18 Feb 2024 22:38) (99% - 100%)    Parameters below as of 18 Feb 2024 22:38  Patient On (Oxygen Delivery Method): room air                              10.3   6.83  )-----------( 360      ( 18 Feb 2024 23:00 )             33.7    02-18    138  |  105  |  12  ----------------------------<  76  4.1   |  23  |  0.8    Ca    9.2      18 Feb 2024 23:00    TPro  7.5  /  Alb  4.5  /  TBili  <0.2  /  DBili  x   /  AST  16  /  ALT  10  /  AlkPhos  57  02-18       PHYSICAL EXAM:  Gen: NAD, well-developed  Head: Normocephalic, Atraumatic  Face: no edema/erythema/fluctuance, parotid glands soft without mass  Eyes: PERRL, EOMI, no scleral injection  Ears: Right - ear canal clear, TM intact without effusion            Left - ear canal clear, TM intact without effusion  Nose: Nares bilaterally patent, no discharge  Mouth: Mucosa moist, tongue/uvula midline, oropharynx clear. Performed FFL thru right nare, unable to visualize any foreign body.  The epiglottis is crisp and VC are mobile.  Further evaluation with glide scope unable to visualize FB. There was a small area of blood lateral to epiglottis on left side which could account for a previously present fish bone.  Otherwise no other abnormalities  Neck: Flat, supple, no lymphadenopathy, trachea midline, no masses  Resp: breathing easily, no stridor  CV: no peripheral edema/cyanosis

## 2024-02-18 NOTE — ED PROVIDER NOTE - NSFOLLOWUPINSTRUCTIONS_ED_ALL_ED_FT
Our Emergency Department Referral Coordinators will be reaching out to you in the next 24-48 hours from 9:00am to 5:00pm with a follow up appointment. Please expect a phone call from the hospital in that time frame. If you do not receive a call or if you have any questions or concerns, you can reach them at   [phone]    (ENT)     -----------------------------------------    Globus Pharyngeus  Globus pharyngeus is a condition that makes a person feel like there is a lump in their throat. It may also feel like there is something stuck in the front of the throat. It does not cause pain. It also does not make it harder to swallow food or liquid. Your health care provider may not see any changes to your throat during an exam.    Globus pharyngeus may come and go but may last a long time when it occurs. In most cases, it does not require treatment.    It may be caused by:  Gastroesophageal reflux. This is when juices from the stomach flow back up into the throat.  Neuralgia. This is irritation of the nerves that help you swallow.  Anxiety, grief, or depression.  Irritation from cigarettes, alcohol, or drinks with caffeine.  Your health care provider will do exams and tests to look for a cause and to rule out other problems.    Follow these instructions at home:  A person practicing relaxation techniques.  Watch for any changes. To help with your condition:  Take over-the-counter and prescription medicines only as told by your health care provider.  Follow instructions from your health care provider about what you may eat and drink. You may need to reduce the amount of alcohol and caffeine that you take in.  Avoid stress. Do relaxation exercises as told by your health care provider.  Do not use any products that contain nicotine or tobacco. These products include cigarettes, chewing tobacco, and vaping devices, such as e-cigarettes. If you need help quitting, ask your health care provider.  It is up to you to get the results of any tests that were done. Ask your health care provider, or the department that is doing the tests, when your results will be ready.  Contact a health care provider if:  Your symptoms get worse.  You have throat pain.  It is hard to swallow.  Food or liquid comes back up into your mouth.  You lose weight without trying.  Get help right away if:  Your throat swells.  This information is not intended to replace advice given to you by your health care provider. Make sure you discuss any questions you have with your health care provider.

## 2024-02-18 NOTE — ED PROVIDER NOTE - PROGRESS NOTE DETAILS
ent consult placed DARIUS: ENT at bedside.  Attempted visualization with glidescope but unsusccessful, then fiberoptics orally which gave good views of the epiglottis, bilateral peritonsillar area without signs of foreign body noted.  Patient had small abrasion just anterior to the left base of the epiglottis with a very small amount of blood noted but no foreign bodies.  CT and x-ray obtained which showed no foreign body.  Will discharge home with ENT follow-up.  Supportive care return precautions advised.  Patient comfortable with plan

## 2024-02-19 DIAGNOSIS — T18.9XXA FOREIGN BODY OF ALIMENTARY TRACT, PART UNSPECIFIED, INITIAL ENCOUNTER: ICD-10-CM

## 2024-02-19 LAB — HCG SERPL QL: NEGATIVE — SIGNIFICANT CHANGE UP

## 2024-02-19 PROCEDURE — 70491 CT SOFT TISSUE NECK W/DYE: CPT | Mod: 26,MA

## 2024-02-19 PROCEDURE — 70360 X-RAY EXAM OF NECK: CPT | Mod: 26

## 2024-02-19 PROCEDURE — 99285 EMERGENCY DEPT VISIT HI MDM: CPT

## 2024-02-19 RX ADMIN — Medication 5 MILLILITER(S): at 00:10

## 2024-02-19 NOTE — ED ADULT NURSE NOTE - NSFALLUNIVINTERV_ED_ALL_ED
Bed/Stretcher in lowest position, wheels locked, appropriate side rails in place/Call bell, personal items and telephone in reach/Instruct patient to call for assistance before getting out of bed/chair/stretcher/Non-slip footwear applied when patient is off stretcher/[place] to call system/Physically safe environment - no spills, clutter or unnecessary equipment/Purposeful proactive rounding/Room/bathroom lighting operational, light cord in reach

## 2024-02-19 NOTE — ED ADULT NURSE NOTE - OBJECTIVE STATEMENT
pt is 24 y/o female c/o feeling like something is stuck in her throat after eating dish. denies sob/difficulty breathing

## 2024-02-22 ENCOUNTER — APPOINTMENT (OUTPATIENT)
Dept: OBGYN | Facility: CLINIC | Age: 24
End: 2024-02-22
Payer: MEDICAID

## 2024-02-22 DIAGNOSIS — N89.8 OTHER SPECIFIED NONINFLAMMATORY DISORDERS OF VAGINA: ICD-10-CM

## 2024-02-22 PROCEDURE — 99214 OFFICE O/P EST MOD 30 MIN: CPT

## 2024-02-22 RX ORDER — METRONIDAZOLE 7.5 MG/G
0.75 GEL VAGINAL
Qty: 1 | Refills: 0 | Status: ACTIVE | COMMUNITY
Start: 2024-02-22 | End: 1900-01-01

## 2024-02-23 NOTE — CHART NOTE - NSCHARTNOTEFT_GEN_A_CORE
[facility] MRN [id number] / Left message 2/20 - JL. Prefers mornings appts, sent to ENT, CT 2/20. / Appointment made - [facility]    Specialty: ENT  Date: February 29, 2024  Time: 12:15 PM   Location: [address] [place] [address]  Clinician: DR. THORNTON

## 2024-02-26 LAB
BV BACTERIA RRNA VAG QL NAA+PROBE: DETECTED
C GLABRATA RNA VAG QL NAA+PROBE: NOT DETECTED
C TRACH RRNA SPEC QL NAA+PROBE: NOT DETECTED
CANDIDA RRNA VAG QL PROBE: NOT DETECTED
N GONORRHOEA RRNA SPEC QL NAA+PROBE: NOT DETECTED
T VAGINALIS RRNA SPEC QL NAA+PROBE: NOT DETECTED

## 2024-02-29 ENCOUNTER — APPOINTMENT (OUTPATIENT)
Dept: OTOLARYNGOLOGY | Facility: CLINIC | Age: 24
End: 2024-02-29

## 2024-02-29 ENCOUNTER — NON-APPOINTMENT (OUTPATIENT)
Age: 24
End: 2024-02-29

## 2024-06-10 ENCOUNTER — APPOINTMENT (OUTPATIENT)
Dept: OBGYN | Facility: CLINIC | Age: 24
End: 2024-06-10

## 2024-06-10 DIAGNOSIS — N91.2 AMENORRHEA, UNSPECIFIED: ICD-10-CM

## 2024-06-10 PROCEDURE — 99213 OFFICE O/P EST LOW 20 MIN: CPT | Mod: 25

## 2024-06-10 PROCEDURE — 76817 TRANSVAGINAL US OBSTETRIC: CPT

## 2024-06-10 NOTE — HISTORY OF PRESENT ILLNESS
[FreeTextEntry1] : ---25 Y/O [id number] LMP 4/20/24 EDC 1/25/25 EGA 7 WEEKS. PMHX;/ PSHX;/ SOCIAL HX;-ETOH -CIGG  STD;  CHLAMYDIA /        DISCUSSED [facility] FAMILY HISTORY OF BREAST CANCER REVIEW OF SYMPTOMS DONE; ALLERGIES; pt answered IBUPROFEN Medication reconciliation was completed by reviewing, with the patient's involvement, the patient's current outpatient medications and those  ordered for the patient today.   PE;ABDOMEN SOFT NT ND  EXT -CCE  A;P;AMENORRHEA;PREG+ -PT UNSURE RE;VTOP -OPTIONS DISCUSSED -RTC 2 WEEKS OR PRN.

## 2024-06-25 ENCOUNTER — APPOINTMENT (OUTPATIENT)
Dept: OBGYN | Facility: CLINIC | Age: 24
End: 2024-06-25

## 2024-07-30 ENCOUNTER — APPOINTMENT (OUTPATIENT)
Dept: OBGYN | Facility: CLINIC | Age: 24
End: 2024-07-30

## 2024-08-23 NOTE — OB RN PATIENT PROFILE - BLOOD TRANSFUSION, PREVIOUS, PROFILE
[FreeTextEntry1] : HCM  Labs reviewed with pt  stable anemia, pre diabetes  Pt will consider gyn, mammogram, colonoscopy/FOBT  Tdap, shingrix, flu utd  continue current meds  will consider flu and covid booster  f/u prn or 1 year.
no

## 2024-09-18 NOTE — OB RN PATIENT PROFILE - AS SC BRADEN SENSORY
We will provide refills.  However, will you checkup on his colonoscopy which has been ordered multiple times now. (4) no impairment

## 2024-09-30 ENCOUNTER — APPOINTMENT (OUTPATIENT)
Dept: OBGYN | Facility: CLINIC | Age: 24
End: 2024-09-30

## 2024-10-03 ENCOUNTER — APPOINTMENT (OUTPATIENT)
Dept: OBGYN | Facility: CLINIC | Age: 24
End: 2024-10-03

## 2024-10-08 ENCOUNTER — APPOINTMENT (OUTPATIENT)
Dept: OBGYN | Facility: CLINIC | Age: 24
End: 2024-10-08
Payer: MEDICAID

## 2024-10-08 DIAGNOSIS — N64.9 DISORDER OF BREAST, UNSPECIFIED: ICD-10-CM

## 2024-10-08 PROCEDURE — 99213 OFFICE O/P EST LOW 20 MIN: CPT

## 2025-05-08 ENCOUNTER — APPOINTMENT (OUTPATIENT)
Dept: OBGYN | Facility: CLINIC | Age: 25
End: 2025-05-08
Payer: MEDICAID

## 2025-05-08 DIAGNOSIS — N91.2 AMENORRHEA, UNSPECIFIED: ICD-10-CM

## 2025-05-08 PROCEDURE — 99214 OFFICE O/P EST MOD 30 MIN: CPT

## 2025-05-08 PROCEDURE — 96127 BRIEF EMOTIONAL/BEHAV ASSMT: CPT

## 2025-05-08 PROCEDURE — 99459 PELVIC EXAMINATION: CPT

## 2025-05-08 RX ORDER — ONDANSETRON 8 MG/1
8 TABLET ORAL
Qty: 30 | Refills: 3 | Status: ACTIVE | COMMUNITY
Start: 2025-05-08 | End: 1900-01-01

## 2025-05-08 RX ORDER — MULTIVIT-MIN/FOLIC/VIT K/LYCOP 400-300MCG
28-0.8 TABLET ORAL DAILY
Qty: 90 | Refills: 3 | Status: ACTIVE | COMMUNITY
Start: 2025-05-08 | End: 1900-01-01

## 2025-05-09 LAB
HCG SERPL-MCNC: ABNORMAL MIU/ML
PROGEST SERPL-MCNC: 19.77 NG/ML

## 2025-05-14 LAB
C TRACH RRNA SPEC QL NAA+PROBE: NOT DETECTED
HPV HIGH+LOW RISK DNA PNL CVX: NOT DETECTED
N GONORRHOEA RRNA SPEC QL NAA+PROBE: NOT DETECTED
SOURCE TP AMPLIFICATION: NORMAL

## 2025-05-19 ENCOUNTER — OUTPATIENT (OUTPATIENT)
Dept: OUTPATIENT SERVICES | Facility: HOSPITAL | Age: 25
LOS: 1 days | End: 2025-05-19
Payer: MEDICAID

## 2025-05-19 ENCOUNTER — APPOINTMENT (OUTPATIENT)
Dept: ANTEPARTUM | Facility: CLINIC | Age: 25
End: 2025-05-19
Payer: MEDICAID

## 2025-05-19 ENCOUNTER — ASOB RESULT (OUTPATIENT)
Age: 25
End: 2025-05-19

## 2025-05-19 DIAGNOSIS — Z34.90 ENCOUNTER FOR SUPERVISION OF NORMAL PREGNANCY, UNSPECIFIED, UNSPECIFIED TRIMESTER: ICD-10-CM

## 2025-05-19 PROCEDURE — 76815 OB US LIMITED FETUS(S): CPT | Mod: 26

## 2025-05-19 PROCEDURE — 76815 OB US LIMITED FETUS(S): CPT

## 2025-05-22 ENCOUNTER — NON-APPOINTMENT (OUTPATIENT)
Age: 25
End: 2025-05-22

## 2025-05-22 ENCOUNTER — APPOINTMENT (OUTPATIENT)
Dept: OBGYN | Facility: CLINIC | Age: 25
End: 2025-05-22
Payer: MEDICAID

## 2025-05-22 DIAGNOSIS — O99.211 OBESITY COMPLICATING PREGNANCY, FIRST TRIMESTER: ICD-10-CM

## 2025-05-22 DIAGNOSIS — O36.80X0 PREGNANCY WITH INCONCLUSIVE FETAL VIABILITY, NOT APPLICABLE OR UNSPECIFIED: ICD-10-CM

## 2025-05-22 DIAGNOSIS — Z3A.09 9 WEEKS GESTATION OF PREGNANCY: ICD-10-CM

## 2025-05-22 PROCEDURE — 0502F SUBSEQUENT PRENATAL CARE: CPT

## 2025-05-23 LAB
ABORH: NORMAL
ANTIBODY SCREEN: NORMAL
BASOPHILS # BLD AUTO: 0.02 K/UL
BASOPHILS NFR BLD AUTO: 0.3 %
EOSINOPHIL # BLD AUTO: 0.45 K/UL
EOSINOPHIL NFR BLD AUTO: 6.7 %
HCT VFR BLD CALC: 34.4 %
HCV AB SER QL: NONREACTIVE
HCV S/CO RATIO: 0.06 COI
HGB BLD-MCNC: 10.6 G/DL
IMM GRANULOCYTES NFR BLD AUTO: 0.3 %
LYMPHOCYTES # BLD AUTO: 2.09 K/UL
LYMPHOCYTES NFR BLD AUTO: 31.1 %
MAN DIFF?: NORMAL
MCHC RBC-ENTMCNC: 25.7 PG
MCHC RBC-ENTMCNC: 30.8 G/DL
MCV RBC AUTO: 83.3 FL
MONOCYTES # BLD AUTO: 0.48 K/UL
MONOCYTES NFR BLD AUTO: 7.2 %
NEUTROPHILS # BLD AUTO: 3.65 K/UL
NEUTROPHILS NFR BLD AUTO: 54.4 %
PLATELET # BLD AUTO: 377 K/UL
PMV BLD AUTO: 0 /100 WBCS
PMV BLD: 10.4 FL
RBC # BLD: 4.13 M/UL
RBC # FLD: 14.6 %
WBC # FLD AUTO: 6.71 K/UL

## 2025-05-25 LAB
HBV SURFACE AG SER QL: NONREACTIVE
HGB A MFR BLD: 97.5 %
HGB A2 MFR BLD: 2.5 %
HGB FRACT BLD-IMP: NORMAL
HIV1+2 AB SPEC QL IA.RAPID: NONREACTIVE
RUBV IGG FLD-ACNC: 7.81 INDEX
RUBV IGG SER-IMP: POSITIVE
T PALLIDUM AB SER QL IA: NEGATIVE
VZV AB TITR SER: POSITIVE
VZV IGG SER IF-ACNC: 8.44 S/CO

## 2025-06-01 LAB — FMR1 GENE MUT ANL BLD/T: NORMAL

## 2025-06-12 NOTE — OB PROVIDER H&P - NSHOSPITALIZATION_OBGYN_ALL_OB
Recommend rest.  Use ice to help with swelling.  Prescriptions provided for Ultram, Robaxin, 800 milligram ibuprofen and lidocaine patches.  Referrals provided for primary care as well as orthopedics if needed.  Return to the ER if your symptoms worsen.  Head injury instructions provided.  
No

## 2025-06-17 ENCOUNTER — APPOINTMENT (OUTPATIENT)
Dept: ANTEPARTUM | Facility: CLINIC | Age: 25
End: 2025-06-17

## 2025-06-19 ENCOUNTER — APPOINTMENT (OUTPATIENT)
Dept: OBGYN | Facility: CLINIC | Age: 25
End: 2025-06-19
Payer: MEDICAID

## 2025-06-19 PROCEDURE — 0502F SUBSEQUENT PRENATAL CARE: CPT

## 2025-06-22 ENCOUNTER — EMERGENCY (EMERGENCY)
Facility: HOSPITAL | Age: 25
LOS: 0 days | Discharge: HOME | End: 2025-06-22
Attending: EMERGENCY MEDICINE
Payer: MEDICAID

## 2025-06-22 VITALS
RESPIRATION RATE: 15 BRPM | DIASTOLIC BLOOD PRESSURE: 67 MMHG | SYSTOLIC BLOOD PRESSURE: 120 MMHG | HEART RATE: 87 BPM | TEMPERATURE: 98 F

## 2025-06-22 DIAGNOSIS — Z88.6 ALLERGY STATUS TO ANALGESIC AGENT: ICD-10-CM

## 2025-06-22 DIAGNOSIS — K08.89 OTHER SPECIFIED DISORDERS OF TEETH AND SUPPORTING STRUCTURES: ICD-10-CM

## 2025-06-22 PROCEDURE — 99283 EMERGENCY DEPT VISIT LOW MDM: CPT

## 2025-06-22 RX ORDER — ACETAMINOPHEN 500 MG/5ML
650 LIQUID (ML) ORAL ONCE
Refills: 0 | Status: COMPLETED | OUTPATIENT
Start: 2025-06-22 | End: 2025-06-22

## 2025-06-22 RX ORDER — AMOXICILLIN 500 MG/1
1 CAPSULE ORAL
Qty: 21 | Refills: 0
Start: 2025-06-22 | End: 2025-06-28

## 2025-06-22 RX ADMIN — Medication 650 MILLIGRAM(S): at 12:46

## 2025-06-22 NOTE — ED PROVIDER NOTE - CLINICAL SUMMARY MEDICAL DECISION MAKING FREE TEXT BOX
Agree with above history and exam.  Patient here with dental pain without evidence of abscess will prescribe antibiotics.  Of note patient is pregnant no further intervention in the ED required at this time outpatient dental follow-up advised.

## 2025-06-22 NOTE — ED PROVIDER NOTE - NSFOLLOWUPINSTRUCTIONS_ED_ALL_ED_FT
Our Emergency Department Referral Coordinators will be reaching out to you in the next 24-48 hours from 9:00am to 5:00pm to schedule a follow up appointment. Please expect a phone call from the hospital in that time frame. If you do not receive a call or if you have any questions or concerns, you can reach them at   [phone]    Dental Pain- - PLEASE TAKE THE MEDICATIONS SENT TO YOUR PHARMACY AS PRESCRIBED AND FOLLOW UP WITH YOUR DENTIST    Dental pain (toothache) may be caused by many things including tooth decay (cavities or caries), abscess or infection, or trauma. If you were prescribed an antibiotic medicine, finish all of it even if you start to feel better. Rinsing your mouth with salt water or applying ice to the painful area of your face may help with the pain. Follow up with a dentist is important in ensuring good oral health and preventing the worsening of dental disease.    SEEK IMMEDIATE MEDICAL CARE IF YOU HAVE ANY OF THE FOLLOWING SYMPTOMS: unable to open your mouth, trouble breathing or swallowing, fever, or swelling of the face, neck, or jaw.

## 2025-06-22 NOTE — ED PROVIDER NOTE - PATIENT PORTAL LINK FT
You can access the FollowMyHealth Patient Portal offered by [facility] by registering at the following website: [email or web address]. By joining [employer]’s FollowMyHealth portal, you will also be able to view your health information using other applications (apps) compatible with our system.

## 2025-06-22 NOTE — ED PROVIDER NOTE - NSFOLLOWUPCLINICS_GEN_ALL_ED_FT
[facility]  Dental  [address] [place] [id number]  Phone: [phone]  Fax:   Follow Up Time: 1-3 Days

## 2025-06-22 NOTE — ED PROVIDER NOTE - PHYSICAL EXAMINATION
CONSTITUTIONAL: NAD  SKIN: Warm dry  HEAD: NCAT  EYES: NL inspection  ENT: MMM, no facial swelling. No tooth abscess  NECK: Supple; non tender.  CARD: RRR  RESP: No respiratory distress, lung sounds clear bilaterally  ABD: S/NT no R/G  EXT: no pedal edema, no calf tenderness  NEURO: Grossly unremarkable  PSYCH: Cooperative, appropriate.

## 2025-06-22 NOTE — ED PROVIDER NOTE - OBJECTIVE STATEMENT
25-year-old female G4, P3 LMP 16 weeks ago presents to the ED complaining of right lower dental pain since yesterday.  Patient stated that the pain has been keeping her awake at night, she took Tylenol without relief. Patient denies having any fever, chills, n/v, cp, sob, pleuritic cp, palpitations, diaphoresis, cough, neck pain/stiffness, abd pain, diarrhea, constipation, urinary symptoms, trauma, ha/lh/dizziness, numbness/tingling, sick contacts, recent travel, or rash.

## 2025-06-22 NOTE — ED ADULT NURSE NOTE - NSFALLUNIVINTERV_ED_ALL_ED
Bed/Stretcher in lowest position, wheels locked, appropriate side rails in place/Call bell, personal items and telephone in reach/Instruct patient to call for assistance before getting out of bed/chair/stretcher/Non-slip footwear applied when patient is off stretcher/[place] to call system/Physically safe environment - no spills, clutter or unnecessary equipment/Purposeful proactive rounding/Room/bathroom lighting operational, light cord in reach

## 2025-06-23 ENCOUNTER — EMERGENCY (EMERGENCY)
Facility: HOSPITAL | Age: 25
LOS: 0 days | Discharge: HOME | End: 2025-06-23
Attending: EMERGENCY MEDICINE
Payer: MEDICAID

## 2025-06-23 VITALS
DIASTOLIC BLOOD PRESSURE: 60 MMHG | HEIGHT: 67 IN | OXYGEN SATURATION: 96 % | WEIGHT: 227.08 LBS | SYSTOLIC BLOOD PRESSURE: 95 MMHG | TEMPERATURE: 99 F | HEART RATE: 76 BPM | RESPIRATION RATE: 17 BRPM

## 2025-06-23 DIAGNOSIS — Z3A.15 15 WEEKS GESTATION OF PREGNANCY: ICD-10-CM

## 2025-06-23 DIAGNOSIS — O99.612 DISEASES OF THE DIGESTIVE SYSTEM COMPLICATING PREGNANCY, SECOND TRIMESTER: ICD-10-CM

## 2025-06-23 PROCEDURE — 99282 EMERGENCY DEPT VISIT SF MDM: CPT

## 2025-06-23 PROCEDURE — D0140: CPT

## 2025-06-23 PROCEDURE — 99283 EMERGENCY DEPT VISIT LOW MDM: CPT

## 2025-06-23 PROCEDURE — D7140: CPT

## 2025-06-23 PROCEDURE — D0220: CPT

## 2025-06-23 RX ORDER — ACETAMINOPHEN 500 MG/5ML
975 LIQUID (ML) ORAL ONCE
Refills: 0 | Status: DISCONTINUED | OUTPATIENT
Start: 2025-06-23 | End: 2025-06-23

## 2025-06-23 NOTE — ED PROVIDER NOTE - OBJECTIVE STATEMENT
Patient is a 25-year-old female currently 15 weeks gestation here for evaluation of left lower dental pain.  Patient has been in contact with her dentist but plans on having a root canal but they were with her insurance which is why they could not go through with it.  Patient denies fever, chills Pt reports L eye redness x1 days, cough, body aches and headache x4 days worsening. Pt reports coughing up green sputum.

## 2025-06-23 NOTE — ED PROVIDER NOTE - PHYSICAL EXAMINATION
VITAL SIGNS: I have reviewed nursing notes and confirm.  CONSTITUTIONAL: Well-developed; well-nourished; in no acute distress.   SKIN: skin exam is warm and dry, no acute rash.    HEAD: Normocephalic; atraumatic.  EYES: conjunctiva and sclera clear.  ENT: poor dentition throughout, no sublingual edema No nasal discharge; airway clear.  EXT: Normal ROM.  No clubbing, cyanosis or edema.   NEURO: Alert, oriented, grossly unremarkable

## 2025-06-23 NOTE — ED PROVIDER NOTE - PATIENT PORTAL LINK FT
You can access the FollowMyHealth Patient Portal offered by [facility] by registering at the following website: [email or web address]. By joining [facility]’s FollowMyHealth portal, you will also be able to view your health information using other applications (apps) compatible with our system.

## 2025-06-27 LAB
1P36 DELETION SYN POPULATION-BASED RISK: NORMAL
1P36 DELETION SYNDROME RESULT: NORMAL
1P36 DELETION SYNDROME RISK AFTER TEST: NORMAL
22Q11.2 DELETION POPULATION-BASED RISK: NORMAL
22Q11.2 DELETION RISK AFTER TEST: NORMAL
22Q11.2 DELETION SYNDROME RESULT: NORMAL
ANGELMAN SYNDROME POPULATION-BASED RISK: NORMAL
ANGELMAN SYNDROME RESULT: NORMAL
ANGELMAN SYNDROME RISK AFTER TEST: NORMAL
CRI-DU-CHAT SYNDR POPULATION-BASED RISK: NORMAL
CRI-DU-CHAT SYNDROME RESULT: NORMAL
CRI-DU-CHAT SYNDROME RISK AFTER TEST: NORMAL
FETAL FRACTION: NORMAL
GENDER OF FETUS: NORMAL
GENE DIS ANL CARRIER INTERP-IMP: NORMAL
MONOSOMY X AGE-BASED RISK: NORMAL
MONOSOMY X RESULT: NORMAL
MONOSOMY X RISK AFTER TEST: NORMAL
PANORAMA SCREEN: NORMAL
PRADER-WILLI SYND POPULATION-BASED RISK: NORMAL
PRADER-WILLI SYNDROME RESULT: NORMAL
PRADER-WILLI SYNDROME RISK AFTER TEST: NORMAL
RPT COMMENT: NORMAL
TEST PERFORMANCE INFO SPEC: NORMAL
TRIPLOIDY RESULT: NORMAL
TRISOMY 13 AGE-BASED RISK: NORMAL
TRISOMY 13 RESULT: NORMAL
TRISOMY 13 RISK AFTER TEST: NORMAL
TRISOMY 18 AGE-BASED RISK: NORMAL
TRISOMY 18 RESULT: NORMAL
TRISOMY 18 RISK AFTER TEST: NORMAL
TRISOMY 21 RESULT: NORMAL
TRISOMY 21 RISK AFTER TEST: NORMAL

## 2025-06-30 NOTE — CONSULT NOTE ADULT - SUBJECTIVE AND OBJECTIVE BOX
Patient is a 25y old  Female who presents with a chief complaint of pain on lower left side of mouth that has been present for a few days.    HPI:      PAST MEDICAL & SURGICAL HISTORY:  No pertinent past medical history      No significant past surgical history        ( - ) heart valve replacement  ( - ) joint replacement  ( - ) pregnancy    MEDICATIONS  (STANDING):    MEDICATIONS  (PRN):      Allergies    ibuprofen (Anaphylaxis)    Intolerances        FAMILY HISTORY:      *SOCIAL HISTORY: (   ) Tobacco; (   ) ETOH    *Last Dental Visit:    Vital Signs Last 24 Hrs  T(C): --  T(F): --  HR: --  BP: --  BP(mean): --  RR: --  SpO2: --        LABS:                  EOE:  TMJ ( - ) clicks                     ( - ) pops                     ( - ) crepitus             Mandible <<FROM>>             Facial bones and MOM <<grossly intact>>             ( - ) trismus             ( - ) lymphadenopathy             ( - ) swelling             ( - ) asymmetry             ( - ) palpation             ( - ) dyspnea             ( - ) dysphagia             ( - ) loss of consciousness    IOE:  <<permanent>> dentition: <<some carious teeth>>           hard/soft palate: <<No pathology noted>>           tongue/FOM <<No pathology noted>>           labial/buccal mucosa <<No pathology noted>>           ( + ) percussion           ( + ) palpation           ( - ) swelling            ( - ) abscess           ( - ) sinus tract    Dentition present: <<y>>  Mobility: <<n>>  Caries: <<y>>         *DENTAL RADIOGRAPHS: 1 periapical radiograph    RADIOLOGY & ADDITIONAL STUDIES: N/A    *ASSESSMENT: Nonrestorable tooth #29 reveals existing occlusal restoration with secondary caries approximating pulp.      *PLAN: Extraction of tooth.    PROCEDURE:   Risks and benefits of treatment discussed as per OS sheet dated 07/13/2000. Consent signed/ side/site completed. Administered 2 carpules of lidocaine 1:100,000 epinephrine via BO block. Elevated and extracted with forceps. Socket cleaned, curetted. Post-operative instructions given.  Treatment: Limited Oral Examination    RECOMMENDATIONS:  1) Complete analgesics as necessary for pain.  2) Dental F/U with outpatient dentist for comprehensive dental care or [facility] dental clinic.  3) If any difficulty swallowing/breathing, fever occur, return to ER.     Resident Name: Damon LANDRY), dental resident

## 2025-07-16 ENCOUNTER — APPOINTMENT (OUTPATIENT)
Dept: OBGYN | Facility: CLINIC | Age: 25
End: 2025-07-16
Payer: MEDICAID

## 2025-07-16 PROBLEM — Z3A.17 17 WEEKS GESTATION OF PREGNANCY: Status: ACTIVE | Noted: 2025-07-16

## 2025-07-16 LAB
BILIRUB UR QL STRIP: NORMAL
GLUCOSE UR-MCNC: NORMAL
HCG UR QL: 1 EU/DL
HGB UR QL STRIP.AUTO: NORMAL
KETONES UR-MCNC: NORMAL
LEUKOCYTE ESTERASE UR QL STRIP: NORMAL
NITRITE UR QL STRIP: NORMAL
PH UR STRIP: 6.5
PROT UR STRIP-MCNC: NORMAL
SP GR UR STRIP: 1.02

## 2025-07-16 PROCEDURE — 0502F SUBSEQUENT PRENATAL CARE: CPT

## 2025-07-17 LAB
2ND TRIMESTER 4 SCREEN SERPL-IMP: NO
AFP ADJ MOM SERPL: 1.13
AFP INTERP SERPL-IMP: NORMAL
AFP INTERP SERPL-IMP: NORMAL
AFP MOM CUT-OFF: 2.8
AFP PERCENTILE: 64.5
AFP SERPL-ACNC: 37.72 NG/ML
CARBAMAZEPINE?: NO
CURRENT SMOKER: NORMAL
DIABETES STATUS PATIENT: NORMAL
GA: NORMAL
GESTATIONAL AGE METHOD: NORMAL
HX OF NTD NARR: NORMAL
MULTIPLE PREGNANCY: NORMAL
NEURAL TUBE DEFECT RISK FETUS: NORMAL
NEURAL TUBE DEFECT RISK POP: NORMAL
TEST PERFORMANCE INFO SPEC: NORMAL
VALPROIC ACID?: NORMAL

## 2025-08-07 ENCOUNTER — ASOB RESULT (OUTPATIENT)
Age: 25
End: 2025-08-07

## 2025-08-07 ENCOUNTER — OUTPATIENT (OUTPATIENT)
Dept: OUTPATIENT SERVICES | Facility: HOSPITAL | Age: 25
LOS: 1 days | End: 2025-08-07
Payer: MEDICAID

## 2025-08-07 ENCOUNTER — APPOINTMENT (OUTPATIENT)
Dept: ANTEPARTUM | Facility: CLINIC | Age: 25
End: 2025-08-07
Payer: MEDICAID

## 2025-08-07 DIAGNOSIS — Z34.90 ENCOUNTER FOR SUPERVISION OF NORMAL PREGNANCY, UNSPECIFIED, UNSPECIFIED TRIMESTER: ICD-10-CM

## 2025-08-07 PROCEDURE — 76811 OB US DETAILED SNGL FETUS: CPT | Mod: 26

## 2025-08-07 PROCEDURE — 76817 TRANSVAGINAL US OBSTETRIC: CPT | Mod: 26

## 2025-08-07 PROCEDURE — 76811 OB US DETAILED SNGL FETUS: CPT

## 2025-08-07 PROCEDURE — 76817 TRANSVAGINAL US OBSTETRIC: CPT

## 2025-08-12 ENCOUNTER — APPOINTMENT (OUTPATIENT)
Dept: OBGYN | Facility: CLINIC | Age: 25
End: 2025-08-12
Payer: MEDICAID

## 2025-08-12 LAB
BILIRUB UR QL STRIP: NORMAL
CLARITY UR: CLEAR
COLLECTION METHOD: NORMAL
GLUCOSE UR-MCNC: NORMAL
HCG UR QL: 4 EU/DL
HGB UR QL STRIP.AUTO: NORMAL
KETONES UR-MCNC: NORMAL
LEUKOCYTE ESTERASE UR QL STRIP: NORMAL
NITRITE UR QL STRIP: NORMAL
PH UR STRIP: 6
PROT UR STRIP-MCNC: NORMAL
SP GR UR STRIP: 1.02

## 2025-08-12 PROCEDURE — 0502F SUBSEQUENT PRENATAL CARE: CPT

## 2025-08-14 DIAGNOSIS — Z3A.20 20 WEEKS GESTATION OF PREGNANCY: ICD-10-CM

## 2025-08-14 DIAGNOSIS — O99.212 OBESITY COMPLICATING PREGNANCY, SECOND TRIMESTER: ICD-10-CM

## 2025-08-14 DIAGNOSIS — O35.8XX0 MATERNAL CARE FOR OTHER (SUSPECTED) FETAL ABNORMALITY AND DAMAGE, NOT APPLICABLE OR UNSPECIFIED: ICD-10-CM

## 2025-08-14 DIAGNOSIS — Z36.3 ENCOUNTER FOR ANTENATAL SCREENING FOR MALFORMATIONS: ICD-10-CM

## 2025-09-09 ENCOUNTER — APPOINTMENT (OUTPATIENT)
Dept: OBGYN | Facility: CLINIC | Age: 25
End: 2025-09-09
Payer: MEDICAID

## 2025-09-09 PROCEDURE — 0502F SUBSEQUENT PRENATAL CARE: CPT
